# Patient Record
Sex: MALE | Race: WHITE | Employment: UNEMPLOYED | URBAN - METROPOLITAN AREA
[De-identification: names, ages, dates, MRNs, and addresses within clinical notes are randomized per-mention and may not be internally consistent; named-entity substitution may affect disease eponyms.]

---

## 2017-02-06 ENCOUNTER — TELEPHONE (OUTPATIENT)
Dept: NEUROLOGY | Facility: CLINIC | Age: 47
End: 2017-02-06

## 2017-03-21 ENCOUNTER — OFFICE VISIT (OUTPATIENT)
Dept: FAMILY MEDICINE | Facility: CLINIC | Age: 47
End: 2017-03-21

## 2017-03-21 VITALS
RESPIRATION RATE: 16 BRPM | OXYGEN SATURATION: 97 % | SYSTOLIC BLOOD PRESSURE: 131 MMHG | BODY MASS INDEX: 24.2 KG/M2 | WEIGHT: 169 LBS | HEIGHT: 70 IN | HEART RATE: 88 BPM | TEMPERATURE: 97.6 F | DIASTOLIC BLOOD PRESSURE: 81 MMHG

## 2017-03-21 DIAGNOSIS — K21.9 GASTROESOPHAGEAL REFLUX DISEASE WITHOUT ESOPHAGITIS: ICD-10-CM

## 2017-03-21 DIAGNOSIS — F10.10 ALCOHOL ABUSE: ICD-10-CM

## 2017-03-21 RX ORDER — CEPHALEXIN 500 MG/1
500 CAPSULE ORAL 2 TIMES DAILY
Qty: 10 CAPSULE | Refills: 0 | Status: SHIPPED | OUTPATIENT
Start: 2017-03-21 | End: 2017-03-26

## 2017-03-21 NOTE — PATIENT INSTRUCTIONS
Paronychia of the Finger or Toe  Paronychia is an infection near a fingernail or toenail. It usually occurs when an opening in the cuticle or an ingrown toenail lets bacteria under the skin.  The infection will need to be drained if pus is present. If the infection has been caught early, you may need only antibiotic treatment. Healing will take about 1 to 2 weeks.  Home care  Follow these guidelines when caring for yourself at home:    Clean and soak the toe or finger. Do this twice a day for the first 3 days. To do so:    Soak your foot or hand in a tub of warm water for 5 minutes. Or hold your toe or finger under a faucet of warm running water for 5 minutes.    Clean any crust away with soap and water using a cotton swab.    Put antibiotic ointment on the infected area.    Change the dressing daily or any time it becomes soiled.    If you were given antibiotics, take them as directed until they are all gone.    If your infection is on a toe, wear comfortable shoes with a lot of toe room. You can also wear open-toed sandals while your toe heals.    You may use acetaminophen or ibuprofen to help with pain, unless another medicine was prescribed. If you have chronic liver or kidney disease, talk with your health care provider before using these medicines. Also talk with your provider if you've had a stomach ulcer or GI bleeding.  Prevention  The following can prevent paronychia:    Trim or push down the skin around the nail (cuticle).    Don't bite your nails.    Don't suck on your thumbs or fingers.  Follow-up care  Follow up with your health care provider, or as advised.  When to seek medical advice  Call your health care provider right away if any of these occur:    Redness, pain, or swelling of the finger or toe gets worse    Red streaks in the skin leading away from the wound    Pus or fluid drain from the nail area    Fever of 100.4 F (38 C) or higher, or as directed by your health care provider    [id number]  [employer]. [address] [place] [address], [place] [address]. All rights reserved. This information is not intended as a substitute for professional medical care. Always follow your healthcare professional's instructions.      Here is the plan from today's visit    1. Felon  - cephALEXin (KEFLEX) 500 MG capsule; Take 1 capsule (500 mg) by mouth 2 times daily for 5 days  Dispense: 10 capsule; Refill: 0  - Lactobacillus (CVS PROBIOTIC ACIDOPHILUS) 10 MG CAPS; Take 10 mg by mouth 3 times daily for 10 days  Dispense: 30 capsule; Refill: 0    Please call or return to clinic if your symptoms don't go away.    Follow up plan:     Thank you for coming to [facility] today.  Lab Testing:  **If you had lab testing today and your results are reassuring or normal they will be mailed to you or sent through [email or web address] within 7 days.   **If the lab tests need quick action we will call you with the results.  The phone number we will call with results is # [phone] (home) . If this is not the best number please call our clinic and change the number.  Medication Refills:  If you need any refills please call your pharmacy and they will contact us.   If you need to  your refill at a new pharmacy, please contact the new pharmacy directly. The new pharmacy will help you get your medications transferred faster.   Scheduling:  If you have any concerns about today's visit or wish to schedule another appointment please call our office during normal business hours [phone] (8-5:00 M-F)  If a referral was made to a [facility] and you don't get a call from central scheduling please call [phone].  If a Mammogram was ordered for you at [facility] call [phone] to schedule or change your appointment.  If you had an XRay/CT/Ultrasound/MRI ordered the number is [phone] to schedule or change your radiology appointment.   Medical Concerns:  If you have urgent medical concerns please  call [phone] at any time of the day.

## 2017-03-21 NOTE — PROGRESS NOTES
HPI:       Gilbert Adams is a 46 year old who presents for the following  Patient presents with:  Ingrown Toenail      This is a 46 year old male with a past medical history significant for alcohol abuse who presented to the clinic with left big toe pain and erythema.   Patient complained of left ingrown big toe nail, redness and pain that started in January of this year. He initially thought he had a gout attack. His left big toe is tender especially during ambulation. He denied similar symptoms in other joints. He also denies any systemic symptoms: headaches, fevers,  blurry vision, chest pain, shortness of breath, abdominal pain, nausea, and vomiting. He has a history of hepatitis C but not on treatment due to his alcohol abuse. He states that he knows he is suppose to stop drinking but doesn't want anything to help with his drinking for now. 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 He has no other complaints.      Problem, Medication and Allergy Lists were   reviewed and are current.     Patient Active Problem List    Diagnosis Date Noted     Elevated liver enzymes 04/04/2016     Priority: Medium     Seizures (H) 10/24/2016     10/15/16. LOC. Seen at [facility]. Possible EtOH withdrawal or cocaine.  MRI - normal. EEG - not done yet. Neuro consult at [facility].  Discussed chem dep treatment on 10/24/16 - declines       Substance abuse 04/04/2016     Cocaine and alcohol. Not willing to quit - 4/2016, 10/2016  Seizure 10/15/16 - [facility]  Last treatment approx 2010/11  Drinks daily           Current Outpatient Prescriptions   Medication Sig Dispense Refill     cephALEXin (KEFLEX) 500 MG capsule Take 1 capsule (500 mg) by mouth 2 times daily for 5 days 10 capsule 0     Lactobacillus (CVS PROBIOTIC ACIDOPHILUS) 10 MG CAPS Take 10 mg by mouth 3 times daily for 10 days 30 capsule 0     ranitidine (ZANTAC) 150 MG tablet Take 1 tablet (150 mg) by mouth daily 60 tablet 3      "Naproxen Sodium (ALEVE PO) Take by mouth as needed       omeprazole (PRILOSEC) 40 MG capsule Take 1 capsule (40 mg) by mouth daily Take 30-60 minutes before a meal. 90 capsule 1       No Known Allergies  Patient is an established patient of this clinic.         Review of Systems:   Review of Systems Review of system negative except as mentioned in HPI.           Physical Exam:   Patient Vitals for the past 24 hrs:   BP Temp Temp src Pulse Resp SpO2 Height Weight   03/21/17 1304 131/81 97.6  F (36.4  C) Oral 88 16 97 % 5' 10\" (177.8 cm) 169 lb (76.7 kg)     Body mass index is 24.25 kg/(m^2).  Vitals were reviewed and were normal     Physical Exam   Constitutional: He appears well-developed and well-nourished.   Alcohol odor    HENT:   Head: Normocephalic and atraumatic.   Right Ear: External ear normal.   Left Ear: External ear normal.   Eyes: Conjunctivae are normal.   Cardiovascular: Normal rate.    Pulmonary/Chest: Effort normal. No respiratory distress. He has no wheezes. He has no rales.   Musculoskeletal:        Feet:          Results:      Results from the last 24 hoursNo results found for this or any previous visit (from the past 24 hour(s)).  Assessment and Plan       Yovani  Comments:  Left big toe swelling, erythema, and tenderness suggestive of paronychia. Swelling not significant enough to warrant I&D procedure. Recommended soaking the toe twice in the next 3-5 days in warm water. I will prescribe Keflex and Probiotics (For GI protection). He will follow up with persistent swelling or systemic symptoms (Refer to patient instruction).   Orders:  -     cephALEXin (KEFLEX) 500 MG capsule; Take 1 capsule (500 mg) by mouth 2 times daily for 5 days  -     Lactobacillus (CVS PROBIOTIC ACIDOPHILUS) 10 MG CAPS; Take 10 mg by mouth 3 times daily for 10 days    Gastroesophageal reflux disease without esophagitis:   -     ranitidine (ZANTAC) 150 MG tablet; Take 1 tablet (150 mg) by mouth daily    Alcohol abuse: "   Patient has a history of alcohol abuse and Hep-C. Discussed increased risk of liver cirrhosis with Hepatitis C infection in addition to alcohol use. Recommended treatment options to assist with alcohol use but patient declined options at this moment. Alcohol use should be addressed during next clinic follow up.         Medications Discontinued During This Encounter   Medication Reason     ranitidine (ZANTAC) 150 MG tablet Reorder     Options for treatment and follow-up care were reviewed with the patient. Gilbert Adams  engaged in the decision making process and verbalized understanding of the options discussed and agreed with the final plan.    Mark Gonzalez MD  PGY2 [facility] Resident   Pager: [phone]

## 2017-03-21 NOTE — PROGRESS NOTES
Preceptor Attestation:   Patient seen and discussed with the resident. Assessment and plan reviewed with resident and agreed upon.   Supervising Physician:  Manjula Vernon MD  [facility]

## 2017-03-21 NOTE — MR AVS SNAPSHOT
After Visit Summary   3/21/2017    Gilbert Adams    MRN: [id number]           Patient Information     Date Of Birth          1970        Visit Information        Provider Department      3/21/2017 1:00 PM Mark Keller MD [facility]        Today's Diagnoses     Felon    -  1      Care Instructions      Paronychia of the Finger or Toe  Paronychia is an infection near a fingernail or toenail. It usually occurs when an opening in the cuticle or an ingrown toenail lets bacteria under the skin.  The infection will need to be drained if pus is present. If the infection has been caught early, you may need only antibiotic treatment. Healing will take about 1 to 2 weeks.  Home care  Follow these guidelines when caring for yourself at home:    Clean and soak the toe or finger. Do this twice a day for the first 3 days. To do so:    Soak your foot or hand in a tub of warm water for 5 minutes. Or hold your toe or finger under a faucet of warm running water for 5 minutes.    Clean any crust away with soap and water using a cotton swab.    Put antibiotic ointment on the infected area.    Change the dressing daily or any time it becomes soiled.    If you were given antibiotics, take them as directed until they are all gone.    If your infection is on a toe, wear comfortable shoes with a lot of toe room. You can also wear open-toed sandals while your toe heals.    You may use acetaminophen or ibuprofen to help with pain, unless another medicine was prescribed. If you have chronic liver or kidney disease, talk with your health care provider before using these medicines. Also talk with your provider if you've had a stomach ulcer or GI bleeding.  Prevention  The following can prevent paronychia:    Trim or push down the skin around the nail (cuticle).    Don't bite your nails.    Don't suck on your thumbs or fingers.  Follow-up care  Follow up with your health care provider, or as  advised.  When to seek medical advice  Call your health care provider right away if any of these occur:    Redness, pain, or swelling of the finger or toe gets worse    Red streaks in the skin leading away from the wound    Pus or fluid drain from the nail area    Fever of 100.4 F (38 C) or higher, or as directed by your health care provider    [id number] [employer]. [address] [place] [address], [place] [address]. All rights reserved. This information is not intended as a substitute for professional medical care. Always follow your healthcare professional's instructions.      Here is the plan from today's visit    1. Felon  - cephALEXin (KEFLEX) 500 MG capsule; Take 1 capsule (500 mg) by mouth 2 times daily for 5 days  Dispense: 10 capsule; Refill: 0  - Lactobacillus (CVS PROBIOTIC ACIDOPHILUS) 10 MG CAPS; Take 10 mg by mouth 3 times daily for 10 days  Dispense: 30 capsule; Refill: 0    Please call or return to clinic if your symptoms don't go away.    Follow up plan:     Thank you for coming to [facility] today.  Lab Testing:  **If you had lab testing today and your results are reassuring or normal they will be mailed to you or sent through [employer] within 7 days.   **If the lab tests need quick action we will call you with the results.  The phone number we will call with results is # [phone] (home) . If this is not the best number please call our clinic and change the number.  Medication Refills:  If you need any refills please call your pharmacy and they will contact us.   If you need to  your refill at a new pharmacy, please contact the new pharmacy directly. The new pharmacy will help you get your medications transferred faster.   Scheduling:  If you have any concerns about today's visit or wish to schedule another appointment please call our office during normal business hours [phone] (8-5:00 M-F)  If a referral was made to a [facility] and you don't get a  call from central scheduling please call [phone].  If a Mammogram was ordered for you at [facility] call [phone] to schedule or change your appointment.  If you had an XRay/CT/Ultrasound/MRI ordered the number is [phone] to schedule or change your radiology appointment.   Medical Concerns:  If you have urgent medical concerns please call [phone] at any time of the day.          Follow-ups after your visit        Who to contact     Please call your clinic at [phone] to:    Ask questions about your health    Make or cancel appointments    Discuss your medicines    Learn about your test results    Speak to your doctor   If you have compliments or concerns about an experience at your clinic, or if you wish to file a complaint, please contact [facility] Patient Relations at [phone] or email us at [email or web address]         Additional Information About Your Visit        [employer] Information     [employer] gives you secure access to your electronic health record. If you see a primary care provider, you can also send messages to your care team and make appointments. If you have questions, please call your primary care clinic.  If you do not have a primary care provider, please call [phone] and they will assist you.      [employer] is an electronic gateway that provides easy, online access to your medical records. With [employer], you can request a clinic appointment, read your test results, renew a prescription or communicate with your care team.     To access your existing account, please contact your [facility] or call [phone] for assistance.        Care EveryWhere ID     This is your Care EveryWhere ID. This could be used by other organizations to access your [facility] medical records  [id number]        Your Vitals Were     Pulse Temperature Respirations Height Pulse Oximetry BMI (Body Mass Index)    88 97.6  F  "(36.4  C) (Oral) 16 5' 10\" (177.8 cm) 97% 24.25 kg/m2       Blood Pressure from Last 3 Encounters:   03/21/17 131/81   10/24/16 129/84   05/10/16 157/89    Weight from Last 3 Encounters:   03/21/17 169 lb (76.7 kg)   10/24/16 185 lb (83.9 kg)   05/10/16 177 lb (80.3 kg)              Today, you had the following     No orders found for display         Today's Medication Changes          These changes are accurate as of: 3/21/17  1:19 PM.  If you have any questions, ask your nurse or doctor.               Start taking these medicines.        Dose/Directions    cephALEXin 500 MG capsule   Commonly known as:  KEFLEX   Used for:  Felon   Started by:  Mark Keller MD        Dose:  500 mg   Take 1 capsule (500 mg) by mouth 2 times daily for 5 days   Quantity:  10 capsule   Refills:  0       CVS PROBIOTIC ACIDOPHILUS 10 MG Caps   Used for:  Felon   Started by:  Mark Keller MD        Dose:  10 mg   Take 10 mg by mouth 3 times daily for 10 days   Quantity:  30 capsule   Refills:  0            Where to get your medicines      These medications were sent to [facility] - [address] [address]     Phone:  [phone]     cephALEXin 500 MG capsule    CVS PROBIOTIC ACIDOPHILUS 10 MG Caps                Primary Care Provider Office Phone # Fax #    Debbie Galicia [phone]       [facility] [address] [facility] [address]        Thank you!     Thank you for choosing [facility]  for your care. Our goal is always to provide you with excellent care. Hearing back from our patients is one way we can continue to improve our services. Please take a few minutes to complete the written survey that you may receive in the mail after your visit with us. Thank you!             Your Updated Medication List - Protect others around you: Learn how to safely use, store and throw away your medicines at " www.disposemymeds.org.          This list is accurate as of: 3/21/17  1:19 PM.  Always use your most recent med list.                   Brand Name Dispense Instructions for use    ALEVE PO      Take by mouth as needed       cephALEXin 500 MG capsule    KEFLEX    10 capsule    Take 1 capsule (500 mg) by mouth 2 times daily for 5 days       CVS PROBIOTIC ACIDOPHILUS 10 MG Caps     30 capsule    Take 10 mg by mouth 3 times daily for 10 days       omeprazole 40 MG capsule    priLOSEC    90 capsule    Take 1 capsule (40 mg) by mouth daily Take 30-60 minutes before a meal.       ranitidine 150 MG tablet    ZANTAC    60 tablet    Take 1 tablet (150 mg) by mouth daily

## 2017-06-30 ENCOUNTER — OFFICE VISIT (OUTPATIENT)
Dept: FAMILY MEDICINE | Facility: CLINIC | Age: 47
End: 2017-06-30

## 2017-06-30 VITALS
HEART RATE: 91 BPM | OXYGEN SATURATION: 98 % | DIASTOLIC BLOOD PRESSURE: 88 MMHG | SYSTOLIC BLOOD PRESSURE: 129 MMHG | RESPIRATION RATE: 18 BRPM | TEMPERATURE: 98.4 F | BODY MASS INDEX: 24.54 KG/M2 | WEIGHT: 171 LBS

## 2017-06-30 DIAGNOSIS — F10.10 ALCOHOL ABUSE: ICD-10-CM

## 2017-06-30 DIAGNOSIS — B35.1 ONYCHOMYCOSIS: Primary | ICD-10-CM

## 2017-06-30 DIAGNOSIS — K21.9 GASTROESOPHAGEAL REFLUX DISEASE WITHOUT ESOPHAGITIS: ICD-10-CM

## 2017-06-30 LAB
ALBUMIN SERPL-MCNC: 4.6 MG/DL (ref 3.8–5)
ALP SERPL-CCNC: 57.9 U/L (ref 31.7–110.7)
ALT SERPL-CCNC: 110.7 U/L (ref 0–45)
AST SERPL-CCNC: 120 U/L (ref 0–55)
BILIRUB SERPL-MCNC: <0.4 MG/DL (ref 0.2–1.3)
BILIRUBIN DIRECT: 0.2 MG/DL (ref 0.1–0.3)
BUN SERPL-MCNC: 13.4 MG/DL (ref 7–21)
CALCIUM SERPL-MCNC: 9.7 MG/DL (ref 8.5–10.1)
CHLORIDE SERPLBLD-SCNC: 104.6 MMOL/L (ref 98–110)
CHOLEST SERPL-MCNC: 191.5 MG/DL (ref 0–200)
CHOLEST/HDLC SERPL: 3.1 {RATIO} (ref 0–5)
CO2 SERPL-SCNC: 25 MMOL/L (ref 20–32)
CREAT SERPL-MCNC: 0.7 MG/DL (ref 0.7–1.3)
GFR SERPL CREATININE-BSD FRML MDRD: >90 ML/MIN/1.7 M2
GLUCOSE SERPL-MCNC: 107.2 MG'DL (ref 70–99)
HDLC SERPL-MCNC: 61 MG/DL
LDLC SERPL CALC-MCNC: 108 MG/DL (ref 0–129)
POTASSIUM SERPL-SCNC: 3.4 MMOL/DL (ref 3.3–4.5)
PROT SERPL-MCNC: 8.2 G/DL (ref 6.8–8.8)
SODIUM SERPL-SCNC: 136.8 MMOL/L (ref 132.6–141.4)
TRIGL SERPL-MCNC: 113.7 MG/DL (ref 0–150)
VLDL CHOLESTEROL: 22.7 MG/DL (ref 7–32)

## 2017-06-30 NOTE — PROGRESS NOTES
"      HPI:       Gilbert Adams is a 47 year old who presents for the following  Patient presents with:  Toenail: reoccuring fungus in left big toe      This is a 47 years old male that presented to the clinic due to concerns of left big toe fungal infection. Gilbert was seen in clinic in March due to concerns of left big toe infection. He was diagnosed with Paronychia and keflex was prescribed. Patient reports improvement after stated clinic visit. Toe infection re-occurred again and patient was seen at the [facility]". Ten days course of keflex was prescribed. Today he is concerned that he could have a toe fungal infection. He denied systemic symptoms: Fever, chills, abdominal pain, nausea, and vomiting. He denies pus discharge from affected toe. Toe nails appears to be loose and tender during ambulation. He has no other complaints. He would like a refill for his ranitidine. He has a history of chronic alcohol abuse and Hep-C. He is not ready to quit alcohol at the moment.      Problem, Medication and Allergy Lists were   reviewed and are current.     Patient Active Problem List    Diagnosis Date Noted     Alcohol abuse 03/21/2017     Priority: Medium     Elevated liver enzymes 04/04/2016     Priority: Medium     Seizures (H) 10/24/2016     10/15/16. LOC. Seen at [facility]. Possible EtOH withdrawal or cocaine.  MRI - normal. EEG - not done yet. Neuro consult at [facility].  Discussed chem dep treatment on 10/24/16 - declines       Substance abuse 04/04/2016     Cocaine and alcohol. Not willing to quit - 4/2016, 10/2016  Seizure 10/15/16 - [facility]  Last treatment approx 2010/11  Drinks daily           Current Outpatient Prescriptions   Medication Sig Dispense Refill     ranitidine (ZANTAC) 150 MG tablet Take 1 tablet (150 mg) by mouth daily 60 tablet 3     Naproxen Sodium (ALEVE PO) Take by mouth as needed         No Known Allergies  Patient is an established patient of this clinic.         Review of Systems:   Review of " Systems Review of system negative except as mentioned in HPI.           Physical Exam:   Patient Vitals for the past 24 hrs:   BP Temp Temp src Pulse Resp SpO2 Weight   06/30/17 0940 129/88 98.4  F (36.9  C) Oral 91 18 98 % 171 lb (77.6 kg)     Body mass index is 24.54 kg/(m^2).  Vitals were reviewed and were normal     Physical Exam   Constitutional: He appears well-developed and well-nourished.   HENT:   Head: Normocephalic.   Eyes: Conjunctivae are normal.   Cardiovascular: Normal rate and regular rhythm.    Pulmonary/Chest: Effort normal and breath sounds normal.   Musculoskeletal:        Feet:    Normal right foot exam.    Skin: Skin is warm. No rash noted.   On exposed skin   Psychiatric: He has a normal mood and affect.         Results:      Results from the last 24 hours  Results for orders placed or performed in visit on 06/30/17 (from the past 24 hour(s))   Lipid [place] ([facility])   Result Value Ref Range    Cholesterol 191.5 0.0 - 200.0 mg/dL    Cholesterol/HDL Ratio 3.1 0.0 - 5.0    HDL Cholesterol 61.0 >40.0 mg/dL    LDL Cholesterol Calculated 108 0 - 129 mg/dL    Triglycerides 113.7 0.0 - 150.0 mg/dL    VLDL Cholesterol 22.7 7.0 - 32.0 mg/dL   Basic Metabolic Panel ([facility])   Result Value Ref Range    Urea Nitrogen 13.4 7.0 - 21.0 mg/dL    Calcium 9.7 8.5 - 10.1 mg/dL    Chloride 104.6 98.0 - 110.0 mmol/L    Carbon Dioxide 25.0 20.0 - 32.0 mmol/L    Creatinine 0.7 0.7 - 1.3 mg/dL    Glucose 107.2 (H) 70.0 - 99.0 mg'dL    Potassium 3.4 3.3 - 4.5 mmol/dL    Sodium 136.8 132.6 - 141.4 mmol/L    GFR Estimate >90 >60.0 mL/min/1.7 m2    GFR Estimate If Black >90 >60.0 mL/min/1.7 m2   Hepatic Panel ([facility])   Result Value Ref Range    Albumin 4.6 3.8 - 5.0 mg/dL    Alkaline Phosphatase 57.9 31.7 - 110.7 U/L    .7 (H) 0.0 - 45.0 U/L    .0 (H) 0.0 - 55.0 U/L    Bilirubin Direct 0.2 0.1 - 0.3 mg/dL    Bilirubin Total <0.4 0.2 - 1.3 mg/dL    Protein Total 8.2 6.8 - 8.8 g/dL     Assessment and  Karen Hunt was seen today for toenail.    Diagnoses and all orders for this visit:    Onychomycosis:  Dysmorphic and loose left big toe nail. Patient has been treated twice for left big toe infection. Case was discussed with attending physician and recommendation was to refer patient to Podiatry. Exam suggestive of early signs of onychomycosis. No findings suggestive of recurrent paronychia or cellulitis. Patient agreed to medical plan.    -     PODIATRY/FOOT & ANKLE SURGERY REFERRAL    Gastroesophageal reflux disease without esophagitis  -     ranitidine (ZANTAC) 150 MG tablet; Take 1 tablet (150 mg) by mouth daily    Alcohol abuse  -     Lipid [place] ([facility])  -     Basic Metabolic Panel ([facility])  -     Hepatic Panel ([facility])    Medications Discontinued During This Encounter   Medication Reason     ranitidine (ZANTAC) 150 MG tablet Reorder     Options for treatment and follow-up care were reviewed with the patient. Gilbert Adasm  engaged in the decision making process and verbalized understanding of the options discussed and agreed with the final plan.    Mark Keller. MD  PGY2 [facility] Resident   Pager: [phone]

## 2017-06-30 NOTE — PROGRESS NOTES
Preceptor Attestation:   Patient seen and discussed with the resident. Assessment and plan reviewed with resident and agreed upon.   Supervising Physician:  Arron Jackson MD  [facility]

## 2017-06-30 NOTE — MR AVS SNAPSHOT
After Visit Summary   6/30/2017    Gilbert Adams    MRN: [id number]           Patient Information     Date Of Birth          1970        Visit Information        Provider Department      6/30/2017 9:40 AM Mark Keller MD [facility]        Today's Diagnoses     Onychomycosis    -  1    Gastroesophageal reflux disease without esophagitis        Alcohol abuse          Care Instructions    Here is the plan from today's visit    1. Gastroesophageal reflux disease without esophagitis    - ranitidine (ZANTAC) 150 MG tablet; Take 1 tablet (150 mg) by mouth daily  Dispense: 60 tablet; Refill: 3    2. Onychomycosis    - PODIATRY/FOOT & ANKLE SURGERY REFERRAL    Please call or return to clinic if your symptoms don't go away.    Follow up plan:     Thank you for coming to [facility] today.  Lab Testing:  **If you had lab testing today and your results are reassuring or normal they will be mailed to you or sent through [employer] within 7 days.   **If the lab tests need quick action we will call you with the results.  The phone number we will call with results is # [phone] (home) . If this is not the best number please call our clinic and change the number.  Medication Refills:  If you need any refills please call your pharmacy and they will contact us.   If you need to  your refill at a new pharmacy, please contact the new pharmacy directly. The new pharmacy will help you get your medications transferred faster.   Scheduling:  If you have any concerns about today's visit or wish to schedule another appointment please call our office during normal business hours [phone] (8-5:00 M-F)  If a referral was made to a [facility] and you don't get a call from central scheduling please call [phone].  If a Mammogram was ordered for you at [facility] call [phone] to schedule or change your appointment.  If you had an  XRay/CT/Ultrasound/MRI ordered the number is [phone] to schedule or change your radiology appointment.   Medical Concerns:  If you have urgent medical concerns please call [phone] at any time of the day.    Naltrexone tablets  What is this medicine?  NALTREXONE (nal TREX one) helps you to remain free of your dependence on opiate drugs or alcohol. It blocks the 'high' that these substances can give you. This medicine is combined with counseling and support groups.  How should I use this medicine?  Take this medicine by mouth with a full glass of water. Follow the directions on the prescription label. Do not take this medicine within 7 to 10 days of taking any opioid drugs. Take your medicine at regular intervals. Do not take your medicine more often than directed. Do not stop taking except on your doctor's advice.  Talk to your pediatrician regarding the use of this medicine in children. Special care may be needed.  What side effects may I notice from receiving this medicine?  Side effects that you should report to your doctor or health care professional as soon as possible:    allergic reactions like skin rash, itching or hives, swelling of the face, lips, or tongue    breathing problems    changes in vision, hearing    confusion    dark urine    depressed mood    diarrhea    fast or irregular heart beat    hallucination, loss of contact with reality    light-colored stools    right upper belly pain    suicidal thoughts or other mood changes    unusually weak or tired    vomiting    yellowing of the eyes or skin  Side effects that usually do not require medical attention (report to your doctor or health care professional if they continue or are bothersome):    aches, pains    change in sex drive or performance    feeling anxious    headache    loss of appetite, nausea    runny nose, sinus problems, sneezing    stomach pain    trouble sleeping  What may interact with this medicine?  Do not take this  medicine with any of the following medications:    any prescription or street opioid drug like codiene, heroin, methadone  This medicine may also interact with the following medications:    disulfiram    thioridazine  What if I miss a dose?  If you miss a dose and remember on the same day, take the missed dose. If you do not remember until the next day, ask your doctor or health care professional about rescheduling your doses. Do not take double or extra doses.  Where should I keep my medicine?  Keep out of the reach of children.  Store at room temperature between 20 and 25 degrees C (68 and 77 degrees F). Throw away any unused medicine after the expiration date.  What should I tell my health care provider before I take this medicine?  They need to know if you have any of these conditions:    if you have used drugs or alcohol within 7 to 10 days    kidney disease    liver disease, including hepatitis    an unusual or allergic reaction to naltrexone, other medicines, foods, dyes, or preservatives    pregnant or trying to get pregnant    breast-feeding  What should I watch for while using this medicine?  Your condition will be monitored carefully while you are receiving this medicine. Visit your doctor or health care professional regularly. For this medicine to be most effective you should attend any counseling or support groups that your doctor or health care professional recommends. Do not try to overcome the effects of the medicine by taking large amounts of narcotics or by drinking large amounts of alcohol. This can cause severe problems including death. Also, you may be more sensitive to lower doses of narcotics after you stop taking this medicine.  If you are going to have surgery, tell your doctor or health care professional that you are taking this medicine.  Do not treat yourself for coughs, colds, pain, or diarrhea. Ask your doctor or health care professional for advice. Some of the ingredients may interact  with this medicine and cause side effects.  Wear a medical ID bracelet or chain, and carry a card that describes your disease and details of your medicine and dosage times.  You may get drowsy or dizzy. Do not drive, use machinery, or do anything that needs mental alertness until you know how this medicine affects you. Do not stand or sit up quickly, especially if you are an older patient. This reduces the risk of dizzy or fainting spells. Alcohol may interfere with the effect of this medicine. Avoid alcoholic drinks.  NOTE:This sheet is a summary. It may not cover all possible information. If you have questions about this medicine, talk to your doctor, pharmacist, or health care provider. Copyright  2017 Gold Standard    Here is the plan from today's visit    1. Gastroesophageal reflux disease without esophagitis    - ranitidine (ZANTAC) 150 MG tablet; Take 1 tablet (150 mg) by mouth daily  Dispense: 60 tablet; Refill: 3    2. Onychomycosis  - PODIATRY/FOOT & ANKLE SURGERY REFERRAL    3. Alcohol abuse    - Lipid [place] ([facility])  - Basic Metabolic Panel ([facility])  - Hepatic Panel ([facility])    Please call or return to clinic if your symptoms don't go away.    Follow up plan: one month     Thank you for coming to [facility] today.  Lab Testing:  **If you had lab testing today and your results are reassuring or normal they will be mailed to you or sent through [employer] within 7 days.   **If the lab tests need quick action we will call you with the results.  The phone number we will call with results is # [phone] (home) . If this is not the best number please call our clinic and change the number.  Medication Refills:  If you need any refills please call your pharmacy and they will contact us.   If you need to  your refill at a new pharmacy, please contact the new pharmacy directly. The new pharmacy will help you get your medications transferred faster.   Scheduling:  If you have any concerns about  today's visit or wish to schedule another appointment please call our office during normal business hours [phone] (8-5:00 M-F)  If a referral was made to a [facility] and you don't get a call from central scheduling please call [phone].  If a Mammogram was ordered for you at [facility] call [phone] to schedule or change your appointment.  If you had an XRay/CT/Ultrasound/MRI ordered the number is [phone] to schedule or change your radiology appointment.   Medical Concerns:  If you have urgent medical concerns please call [phone] at any time of the day.                Follow-ups after your visit        Additional Services     PODIATRY/FOOT & ANKLE SURGERY REFERRAL       Your provider has referred you to: ROGELIO: [facility]: [address] [place] [address] Patient Scheduling Line: [phone] option 1 (scheduling and directions)    Please be aware that coverage of these services is subject to the terms and limitations of your health insurance plan.  Call member services at your health plan with any benefit or coverage questions.      Please bring the following to your appointment:  >>   Any x-rays, CTs or MRIs which have been performed.  Contact the facility where they were done to arrange for  prior to your scheduled appointment.    >>   List of current medications   >>   This referral request   >>   Any documents/labs given to you for this referral    This is a 47 years old male with left big toe pain and erythema. He has gone through two different courses of Keflex for paronychia. He has diastrophic toe nails with early signs of onychomycosis. Please evaluate and treat.                  Who to contact     Please call your clinic at [phone] to:    Ask questions about your health    Make or cancel appointments    Discuss your medicines    Learn about your test results    Speak to your doctor   If you have compliments or concerns about  an experience at your clinic, or if you wish to file a complaint, please contact [facility] Patient Relations at [phone] or email us at [email or web address]         Additional Information About Your Visit        F[employer] Information     [employer] gives you secure access to your electronic health record. If you see a primary care provider, you can also send messages to your care team and make appointments. If you have questions, please call your primary care clinic.  If you do not have a primary care provider, please call [phone] and they will assist you.      Ztail is an electronic gateway that provides easy, online access to your medical records. With Ztail, you can request a clinic appointment, read your test results, renew a prescription or communicate with your care team.     To access your existing account, please contact your [facility] or call [phone] for assistance.        Care EveryWhere ID     This is your Care EveryWhere ID. This could be used by other organizations to access your [place] medical records  [id number]        Your Vitals Were     Pulse Temperature Respirations Pulse Oximetry BMI (Body Mass Index)       91 98.4  F (36.9  C) (Oral) 18 98% 24.54 kg/m2        Blood Pressure from Last 3 Encounters:   06/30/17 129/88   03/21/17 131/81   10/24/16 129/84    Weight from Last 3 Encounters:   06/30/17 171 lb (77.6 kg)   03/21/17 169 lb (76.7 kg)   10/24/16 185 lb (83.9 kg)              We Performed the Following     Basic Metabolic Panel ([facility])     Hepatic Panel ([facility])     Lipid [place] ([facility])     PODIATRY/FOOT & ANKLE SURGERY REFERRAL          Where to get your medicines      These medications were sent to [facility] - [address]  [address] [place], [place] [address] [address]     Phone:  [phone]     ranitidine 150 MG tablet          Primary Care Provider Office Phone # Fax #     Debbie Galicia [phone]       [facility] [address] [facility] [address]        Equal Access to Services     RASHI ELIZONDO : Sarah Tierney, javon arnold, lisavicki kinneymatiara rolonsandratiara, mundo marain hayaan ольгаyasmani chandler koko singh. So [facility] [phone].    ATENCIÓN: Si habla español, tiene a murdock disposición servicios gratuitos de asistencia lingüística. Llame al [phone].    We comply with applicable federal civil rights laws and [place] laws. We do not discriminate on the basis of race, color, national origin, age, disability sex, sexual orientation or gender identity.            Thank you!     Thank you for choosing [facility]  for your care. Our goal is always to provide you with excellent care. Hearing back from our patients is one way we can continue to improve our services. Please take a few minutes to complete the written survey that you may receive in the mail after your visit with us. Thank you!             Your Updated Medication List - Protect others around you: Learn how to safely use, store and throw away your medicines at www.disposemymeds.org.          This list is accurate as of: 6/30/17 10:11 AM.  Always use your most recent med list.                   Brand Name Dispense Instructions for use Diagnosis    ALEVE PO      Take by mouth as needed        ranitidine 150 MG tablet    ZANTAC    60 tablet    Take 1 tablet (150 mg) by mouth daily    Gastroesophageal reflux disease without esophagitis

## 2017-06-30 NOTE — PATIENT INSTRUCTIONS
Here is the plan from today's visit    1. Gastroesophageal reflux disease without esophagitis    - ranitidine (ZANTAC) 150 MG tablet; Take 1 tablet (150 mg) by mouth daily  Dispense: 60 tablet; Refill: 3    2. Onychomycosis    - PODIATRY/FOOT & ANKLE SURGERY REFERRAL    Please call or return to clinic if your symptoms don't go away.    Follow up plan:     Thank you for coming to [facility] today.  Lab Testing:  **If you had lab testing today and your results are reassuring or normal they will be mailed to you or sent through [employer] within 7 days.   **If the lab tests need quick action we will call you with the results.  The phone number we will call with results is # [phone] (home) . If this is not the best number please call our clinic and change the number.  Medication Refills:  If you need any refills please call your pharmacy and they will contact us.   If you need to  your refill at a new pharmacy, please contact the new pharmacy directly. The new pharmacy will help you get your medications transferred faster.   Scheduling:  If you have any concerns about today's visit or wish to schedule another appointment please call our office during normal business hours [phone] (8-5:00 M-F)  If a referral was made to a [facility] and you don't get a call from central scheduling please call [phone].  If a Mammogram was ordered for you at [facility] call [phone] to schedule or change your appointment.  If you had an XRay/CT/Ultrasound/MRI ordered the number is [phone] to schedule or change your radiology appointment.   Medical Concerns:  If you have urgent medical concerns please call [phone] at any time of the day.    Naltrexone tablets  What is this medicine?  NALTREXONE (nal TREX one) helps you to remain free of your dependence on opiate drugs or alcohol. It blocks the 'high' that these substances can give you. This medicine is combined with  counseling and support groups.  How should I use this medicine?  Take this medicine by mouth with a full glass of water. Follow the directions on the prescription label. Do not take this medicine within 7 to 10 days of taking any opioid drugs. Take your medicine at regular intervals. Do not take your medicine more often than directed. Do not stop taking except on your doctor's advice.  Talk to your pediatrician regarding the use of this medicine in children. Special care may be needed.  What side effects may I notice from receiving this medicine?  Side effects that you should report to your doctor or health care professional as soon as possible:    allergic reactions like skin rash, itching or hives, swelling of the face, lips, or tongue    breathing problems    changes in vision, hearing    confusion    dark urine    depressed mood    diarrhea    fast or irregular heart beat    hallucination, loss of contact with reality    light-colored stools    right upper belly pain    suicidal thoughts or other mood changes    unusually weak or tired    vomiting    yellowing of the eyes or skin  Side effects that usually do not require medical attention (report to your doctor or health care professional if they continue or are bothersome):    aches, pains    change in sex drive or performance    feeling anxious    headache    loss of appetite, nausea    runny nose, sinus problems, sneezing    stomach pain    trouble sleeping  What may interact with this medicine?  Do not take this medicine with any of the following medications:    any prescription or street opioid drug like codiene, heroin, methadone  This medicine may also interact with the following medications:    disulfiram    thioridazine  What if I miss a dose?  If you miss a dose and remember on the same day, take the missed dose. If you do not remember until the next day, ask your doctor or health care professional about rescheduling your doses. Do not take double or  extra doses.  Where should I keep my medicine?  Keep out of the reach of children.  Store at room temperature between 20 and 25 degrees C (68 and 77 degrees F). Throw away any unused medicine after the expiration date.  What should I tell my health care provider before I take this medicine?  They need to know if you have any of these conditions:    if you have used drugs or alcohol within 7 to 10 days    kidney disease    liver disease, including hepatitis    an unusual or allergic reaction to naltrexone, other medicines, foods, dyes, or preservatives    pregnant or trying to get pregnant    breast-feeding  What should I watch for while using this medicine?  Your condition will be monitored carefully while you are receiving this medicine. Visit your doctor or health care professional regularly. For this medicine to be most effective you should attend any counseling or support groups that your doctor or health care professional recommends. Do not try to overcome the effects of the medicine by taking large amounts of narcotics or by drinking large amounts of alcohol. This can cause severe problems including death. Also, you may be more sensitive to lower doses of narcotics after you stop taking this medicine.  If you are going to have surgery, tell your doctor or health care professional that you are taking this medicine.  Do not treat yourself for coughs, colds, pain, or diarrhea. Ask your doctor or health care professional for advice. Some of the ingredients may interact with this medicine and cause side effects.  Wear a medical ID bracelet or chain, and carry a card that describes your disease and details of your medicine and dosage times.  You may get drowsy or dizzy. Do not drive, use machinery, or do anything that needs mental alertness until you know how this medicine affects you. Do not stand or sit up quickly, especially if you are an older patient. This reduces the risk of dizzy or fainting spells. Alcohol  may interfere with the effect of this medicine. Avoid alcoholic drinks.  NOTE:This sheet is a summary. It may not cover all possible information. If you have questions about this medicine, talk to your doctor, pharmacist, or health care provider. Copyright  2017 Gold Standard    Here is the plan from today's visit    1. Gastroesophageal reflux disease without esophagitis    - ranitidine (ZANTAC) 150 MG tablet; Take 1 tablet (150 mg) by mouth daily  Dispense: 60 tablet; Refill: 3    2. Onychomycosis  - PODIATRY/FOOT & ANKLE SURGERY REFERRAL    3. Alcohol abuse    - Lipid [place] ([facility])  - Basic Metabolic Panel ([facility])  - Hepatic Panel ([facility])    Please call or return to clinic if your symptoms don't go away.    Follow up plan: one month     Thank you for coming to [facility] today.  Lab Testing:  **If you had lab testing today and your results are reassuring or normal they will be mailed to you or sent through [employer] within 7 days.   **If the lab tests need quick action we will call you with the results.  The phone number we will call with results is # [phone] (home) . If this is not the best number please call our clinic and change the number.  Medication Refills:  If you need any refills please call your pharmacy and they will contact us.   If you need to  your refill at a new pharmacy, please contact the new pharmacy directly. The new pharmacy will help you get your medications transferred faster.   Scheduling:  If you have any concerns about today's visit or wish to schedule another appointment please call our office during normal business hours [phone] (8-5:00 M-F)  If a referral was made to a [facility] and you don't get a call from central scheduling please call [phone].  If a Mammogram was ordered for you at [facility] call [phone] to schedule or change your appointment.  If you had an XRay/CT/Ultrasound/MRI ordered the number is  [phone] to schedule or change your radiology appointment.   Medical Concerns:  If you have urgent medical concerns please call [phone] at any time of the day.

## 2019-01-08 NOTE — TELEPHONE ENCOUNTER
Referral made in October by Dr Jackson for convulsions. Left patient 2 messages. Called patient today and was able to get through to him. Patient stated that he saw another provider for this already and does not wish to schedule at this time.    [phone]

## 2019-09-30 ENCOUNTER — HEALTH MAINTENANCE LETTER (OUTPATIENT)
Age: 49
End: 2019-09-30

## 2020-10-20 ENCOUNTER — APPOINTMENT (OUTPATIENT)
Dept: CT IMAGING | Facility: CLINIC | Age: 50
End: 2020-10-20
Attending: EMERGENCY MEDICINE

## 2020-10-20 ENCOUNTER — HOSPITAL ENCOUNTER (EMERGENCY)
Facility: CLINIC | Age: 50
Discharge: LEFT BEFORE DISCHARGE | End: 2020-10-20
Attending: EMERGENCY MEDICINE | Admitting: EMERGENCY MEDICINE

## 2020-10-20 VITALS
SYSTOLIC BLOOD PRESSURE: 127 MMHG | DIASTOLIC BLOOD PRESSURE: 78 MMHG | TEMPERATURE: 97.8 F | OXYGEN SATURATION: 95 % | HEART RATE: 86 BPM | RESPIRATION RATE: 18 BRPM

## 2020-10-20 DIAGNOSIS — F10.239 ALCOHOL DEPENDENCE WITH WITHDRAWAL WITH COMPLICATION (H): ICD-10-CM

## 2020-10-20 DIAGNOSIS — G40.909 RECURRENT SEIZURES (H): ICD-10-CM

## 2020-10-20 DIAGNOSIS — S01.112A LACERATION OF LEFT EYEBROW, INITIAL ENCOUNTER: ICD-10-CM

## 2020-10-20 DIAGNOSIS — E87.6 HYPOPOTASSEMIA: ICD-10-CM

## 2020-10-20 LAB
ALBUMIN SERPL-MCNC: 3.8 G/DL (ref 3.4–5)
ALP SERPL-CCNC: 70 U/L (ref 40–150)
ALT SERPL W P-5'-P-CCNC: 128 U/L (ref 0–70)
ANION GAP SERPL CALCULATED.3IONS-SCNC: 14 MMOL/L (ref 3–14)
AST SERPL W P-5'-P-CCNC: 336 U/L (ref 0–45)
BASOPHILS # BLD AUTO: 0.1 10E9/L (ref 0–0.2)
BASOPHILS NFR BLD AUTO: 1.3 %
BILIRUB SERPL-MCNC: 0.8 MG/DL (ref 0.2–1.3)
BUN SERPL-MCNC: 5 MG/DL (ref 7–30)
CALCIUM SERPL-MCNC: 8.6 MG/DL (ref 8.5–10.1)
CHLORIDE SERPL-SCNC: 101 MMOL/L (ref 94–109)
CO2 SERPL-SCNC: 24 MMOL/L (ref 20–32)
CREAT SERPL-MCNC: 0.67 MG/DL (ref 0.66–1.25)
DIFFERENTIAL METHOD BLD: ABNORMAL
EOSINOPHIL # BLD AUTO: 0.1 10E9/L (ref 0–0.7)
EOSINOPHIL NFR BLD AUTO: 1.1 %
ERYTHROCYTE [DISTWIDTH] IN BLOOD BY AUTOMATED COUNT: 13.1 % (ref 10–15)
ETHANOL SERPL-MCNC: 0.18 G/DL
GFR SERPL CREATININE-BSD FRML MDRD: >90 ML/MIN/{1.73_M2}
GLUCOSE SERPL-MCNC: 97 MG/DL (ref 70–99)
HCT VFR BLD AUTO: 38 % (ref 40–53)
HGB BLD-MCNC: 12.4 G/DL (ref 13.3–17.7)
IMM GRANULOCYTES # BLD: 0 10E9/L (ref 0–0.4)
IMM GRANULOCYTES NFR BLD: 0.4 %
LIPASE SERPL-CCNC: 302 U/L (ref 73–393)
LYMPHOCYTES # BLD AUTO: 1.9 10E9/L (ref 0.8–5.3)
LYMPHOCYTES NFR BLD AUTO: 36.6 %
MCH RBC QN AUTO: 30.1 PG (ref 26.5–33)
MCHC RBC AUTO-ENTMCNC: 32.6 G/DL (ref 31.5–36.5)
MCV RBC AUTO: 92 FL (ref 78–100)
MONOCYTES # BLD AUTO: 0.9 10E9/L (ref 0–1.3)
MONOCYTES NFR BLD AUTO: 17.3 %
NEUTROPHILS # BLD AUTO: 2.3 10E9/L (ref 1.6–8.3)
NEUTROPHILS NFR BLD AUTO: 43.3 %
NRBC # BLD AUTO: 0 10*3/UL
NRBC BLD AUTO-RTO: 0 /100
PLATELET # BLD AUTO: 217 10E9/L (ref 150–450)
POTASSIUM SERPL-SCNC: 2.7 MMOL/L (ref 3.4–5.3)
POTASSIUM SERPL-SCNC: 2.9 MMOL/L (ref 3.4–5.3)
PROT SERPL-MCNC: 8.4 G/DL (ref 6.8–8.8)
RBC # BLD AUTO: 4.12 10E12/L (ref 4.4–5.9)
SODIUM SERPL-SCNC: 138 MMOL/L (ref 133–144)
WBC # BLD AUTO: 5.3 10E9/L (ref 4–11)

## 2020-10-20 PROCEDURE — 80053 COMPREHEN METABOLIC PANEL: CPT | Performed by: EMERGENCY MEDICINE

## 2020-10-20 PROCEDURE — 96375 TX/PRO/DX INJ NEW DRUG ADDON: CPT | Performed by: EMERGENCY MEDICINE

## 2020-10-20 PROCEDURE — 250N000009 HC RX 250: Performed by: EMERGENCY MEDICINE

## 2020-10-20 PROCEDURE — 83690 ASSAY OF LIPASE: CPT | Performed by: EMERGENCY MEDICINE

## 2020-10-20 PROCEDURE — 70450 CT HEAD/BRAIN W/O DYE: CPT | Mod: 26 | Performed by: RADIOLOGY

## 2020-10-20 PROCEDURE — 96365 THER/PROPH/DIAG IV INF INIT: CPT | Performed by: EMERGENCY MEDICINE

## 2020-10-20 PROCEDURE — 93005 ELECTROCARDIOGRAM TRACING: CPT | Performed by: EMERGENCY MEDICINE

## 2020-10-20 PROCEDURE — 70450 CT HEAD/BRAIN W/O DYE: CPT

## 2020-10-20 PROCEDURE — 85025 COMPLETE CBC W/AUTO DIFF WBC: CPT | Performed by: EMERGENCY MEDICINE

## 2020-10-20 PROCEDURE — 72125 CT NECK SPINE W/O DYE: CPT | Mod: 26 | Performed by: RADIOLOGY

## 2020-10-20 PROCEDURE — 250N000011 HC RX IP 250 OP 636: Performed by: EMERGENCY MEDICINE

## 2020-10-20 PROCEDURE — 12011 RPR F/E/E/N/L/M 2.5 CM/<: CPT | Performed by: EMERGENCY MEDICINE

## 2020-10-20 PROCEDURE — 72125 CT NECK SPINE W/O DYE: CPT

## 2020-10-20 PROCEDURE — 84132 ASSAY OF SERUM POTASSIUM: CPT | Performed by: EMERGENCY MEDICINE

## 2020-10-20 PROCEDURE — 80320 DRUG SCREEN QUANTALCOHOLS: CPT | Performed by: EMERGENCY MEDICINE

## 2020-10-20 PROCEDURE — 99285 EMERGENCY DEPT VISIT HI MDM: CPT | Mod: 25 | Performed by: EMERGENCY MEDICINE

## 2020-10-20 RX ORDER — DIAZEPAM 10 MG/2ML
5 INJECTION, SOLUTION INTRAMUSCULAR; INTRAVENOUS ONCE
Status: COMPLETED | OUTPATIENT
Start: 2020-10-20 | End: 2020-10-20

## 2020-10-20 RX ORDER — POTASSIUM CHLORIDE 7.45 MG/ML
10 INJECTION INTRAVENOUS CONTINUOUS
Status: DISCONTINUED | OUTPATIENT
Start: 2020-10-20 | End: 2020-10-21 | Stop reason: HOSPADM

## 2020-10-20 RX ORDER — LORAZEPAM 2 MG/ML
1 INJECTION INTRAMUSCULAR ONCE
Status: COMPLETED | OUTPATIENT
Start: 2020-10-20 | End: 2020-10-20

## 2020-10-20 RX ORDER — DIAZEPAM 10 MG/2ML
INJECTION, SOLUTION INTRAMUSCULAR; INTRAVENOUS
Status: DISCONTINUED
Start: 2020-10-20 | End: 2020-10-21 | Stop reason: HOSPADM

## 2020-10-20 RX ADMIN — LORAZEPAM 1 MG: 2 INJECTION INTRAMUSCULAR; INTRAVENOUS at 17:51

## 2020-10-20 RX ADMIN — POTASSIUM CHLORIDE 10 MEQ: 7.46 INJECTION, SOLUTION INTRAVENOUS at 19:56

## 2020-10-20 RX ADMIN — LIDOCAINE HYDROCHLORIDE 10 ML: 10 INJECTION, SOLUTION EPIDURAL; INFILTRATION; INTRACAUDAL; PERINEURAL at 19:58

## 2020-10-20 RX ADMIN — DIAZEPAM 5 MG: 5 INJECTION, SOLUTION INTRAMUSCULAR; INTRAVENOUS at 21:43

## 2020-10-20 ASSESSMENT — ENCOUNTER SYMPTOMS
NECK PAIN: 0
SEIZURES: 1

## 2020-10-20 NOTE — ED AVS SNAPSHOT
[facility]  [address] [address]  Phone: [phone]                                    Gilbert Adams   MRN: [id number]    Department: [facility]   Date of Visit: 10/20/2020           After Visit Summary Signature Page    I have received my discharge instructions, and my questions have been answered. I have discussed any challenges I see with this plan with the nurse or doctor.    ..........................................................................................................................................  Patient/Patient Representative Signature      ..........................................................................................................................................  Patient Representative Print Name and Relationship to Patient    ..................................................               ................................................  Date                                   Time    ..........................................................................................................................................  Reviewed by Signature/Title    ...................................................              ..............................................  Date                                               Time          22EPIC Rev 08/18

## 2020-10-20 NOTE — ED PROVIDER NOTES
"    [place] EMERGENCY DEPARTMENT ([facility])  October 20, 2020  ED 3 5:41 PM   History     Chief Complaint   Patient presents with     Seizures     Post ictal     The history is provided by the patient and medical records. The history is limited by the condition of the patient (postictal).     Gilbert Adams is a 50 year old male with history of hepatitis C and alcohol abuse who presents via EMS with possible seizure spell as well as fall with head injury.  Patient had an unwitnessed fall today which caused him to hit a wall and fall to the ground.  Neighbors checked on him and found him on the ground, having a seizure lasting approximately 40 seconds.  When seizure stopped he was postictal and confused.  911 was called and EMS found him with a blood sugar of 131 with waxing and waning mental clarity but overall confusion as well as a laceration over his left eyebrow.  He reported alcohol use to medics.  Here he acknowledges having seizures in the past.  He is otherwise really unable to answer questions, replies \"uh, um, uh I do not know\" to most questions.  He is not on anti-epileptic drugs, does not have a neurologist.  He does drink every day. He doesn't know when his last drink was either. When asked if he develops alcohol withdrawal symptoms with alcohol cessation he states \"I don't know.\" No neck pain.     Care everywhere records reviewed, patient had similar symptoms back on 4/24/2019 where he had a fall from standing posture and had a full body seizure of unclear duration.  On EMS arrival he was postictal but clearing.  He was noted to have tongue fasciculations as well as tremors in his upper extremities.  He sustained a laceration over the occiput with this fall.  At that time he also reported history of possible alcohol withdrawal seizures in setting of daily vodka use.  He was subsequently admitted for alcohol withdrawal seizures, was kept inpatient for 2 days and then discharged to home.  He " was treated with multiple doses of Valium for his withdrawal.    PAST MEDICAL HISTORY:   Past Medical History:   Diagnosis Date     Alcohol abuse     vodka, daily basis     Alcohol withdrawal seizure (H)     multiple withdrawal seizures and falls     Hepatitis C virus        PAST SURGICAL HISTORY:   Past Surgical History:   Procedure Laterality Date     APPENDECTOMY         Past medical history, past surgical history, medications, and allergies were reviewed with the patient. Additional pertinent items: None    FAMILY HISTORY:   Family History   Problem Relation Age of Onset     Diabetes No family hx of      Coronary Artery Disease No family hx of      Hypertension No family hx of      Cerebrovascular Disease No family hx of      Breast Cancer No family hx of      Colon Cancer No family hx of      Prostate Cancer No family hx of      Other Cancer No family hx of        SOCIAL HISTORY:   Social History     Tobacco Use     Smoking status: Current Every Day Smoker     Packs/day: 0.50     Years: 30.00     Pack years: 15.00     Types: Cigarettes     Smokeless tobacco: Never Used     Tobacco comment: 10 cig x day    Substance Use Topics     Alcohol use: Yes     Alcohol/week: 0.0 standard drinks     Comment: 5-6 drinks a day ,     Social history was reviewed with the patient. Additional pertinent items: None      Discharge Medication List as of 10/20/2020 10:26 PM      CONTINUE these medications which have NOT CHANGED    Details   Naproxen Sodium (ALEVE PO) Take by mouth as needed, Historical      ranitidine (ZANTAC) 150 MG tablet Take 1 tablet (150 mg) by mouth daily, Disp-60 tablet, R-3, E-Prescribe              No Known Allergies     Review of Systems   Unable to perform ROS: Other   Musculoskeletal: Negative for neck pain.   Neurological: Positive for seizures.   Alcohol intoxication/postictal confusion  A complete review of systems was performed with pertinent positives and negatives noted in the HPI, and all other  "systems negative.    Physical Exam   BP: (!) 158/100  Pulse: (!) 49  Temp: 97.8  F (36.6  C)  Resp: 12  SpO2: 98 %      Physical Exam  General: awake, alert, NAD  Head: normal cephalic, atraumatic  HEENT: pupils equal, conjugate gaze intact. 2cm laceration over left eyebrow   Neck: Supple, no midline cervical tenderness  CV: regular rate and rhythm without murmur  Lungs: clear to ascultation  Abd: soft, non-tender, no guarding, no peritoneal signs  EXT: No evidence of injury or deformity of upper or lower extremities.  Neuro: awake, answers some questions appropriately but answers \"I do not know\" to several others.  Moves all extremities equally.     ED Course        Procedures        EKG: No signs of acute ischemia patient does have a slightly prolonged QT at 500.  No U waves present.    No results found for this or any previous visit (from the past 24 hour(s)).  Medications   LORazepam (ATIVAN) injection 1 mg (1 mg Intravenous Given 10/20/20 1751)   lidocaine 1 % 10 mL (10 mLs Intradermal Given 10/20/20 1958)   diazepam (VALIUM) injection 5 mg (5 mg Intravenous Given 10/20/20 2143)          [facility] Procedure Note        Laceration Repair:    Performed by: Asa Gonzales MD  Authorized by: Asa Gonzales MD  Consent given by: Patient who states understanding of the procedure being performed after discussing the risks, benefits and alternatives.    Preparation: Patient was prepped and draped in usual sterile fashion.  Irrigation solution: saline    Body area:left eyebrow  Laceration length: 2cm  Contamination: The wound is not contaminated.  Foreign bodies:none  Tendon involvement: none  Anesthesia: Local  Local anesthetic: Lidocaine     1%, with epinephrine  Anesthetic total: 2ml    Debridement: none  Skin closure: Closed with 4 x 5.0 Ethilon  Technique: interrupted  Approximation: close  Approximation difficulty: simple    Patient tolerance: Patient tolerated the procedure well with no immediate " complications.      Assessments & Plan (with Medical Decision Making)   Gilbert is a 50-year-old male who presents with seizure.  Patient is somewhat postictal currently and is mildly confused but has no focal neurologic deficits on exam, moves both extremities equally.  Patient has some evidence of facial trauma otherwise remainder of the head and physical exam is atraumatic.  Patient was given 1 mg of Ativan upon arrival into the ED given the concerns for alcohol withdrawal and seizure.    He is awake mildly confused otherwise alert normal vital signs.  Mild tongue fasciculations on exam    Differential include alcohol withdrawal seizures, primary seizure disorder, electrolyte abnormality, acute intracranial pathology either causing or result from seizure.  Patient will obtain head CT, basic labs and C-spine CT.     Head CT notable for no acute intracranial pathology, C-spine without acute pathology.  Patient initial potassium was 2.7, he also has elevated ALT and AST.  Alcohol level 0.18.  EKG was obtained given the low potassium, he had a slightly prolonged QT.  He was given IV potassium replacement.  Neurology was consulted, nothing to do from their standpoint they felt that these are alcohol withdrawal seizures did not recommend outpatient EEG medicine or further evaluation.    Patient's laceration was closed with suture on repeat evaluation he was awake, alert mentating well, he was becoming anxious, tremulous, with worsening tongue fasciculations and he was given 5 of IV Valium.    Discussed admission with the patient, he adamantly declined.  Discussed that his potassium was low and that he would benefit from IV potassium placement furthermore concerned about his alcohol withdrawal and that this is a life-threatening would brian to treat his alcohol withdrawals.  Patient declined he understood the risks and benefits.  Patient reports that he has been a longstanding alcoholic and has  withdrawals in the past and  "is adamant he does not want to stay.  I also spoke with patient significant other she also agreed and stated that she did not want him hospitalized, she reports \"this is not my first rodeo\" and is comfortable taking him home.  I did explain the risks of alcohol withdrawal to her and low potassium her she expressed their they are both aware of this and they both wanted the patient to be discharged AGAINST MEDICAL ADVICE.  Patient's tetanus was updated.  He was given basic wound care instructions.    This part of the medical record was transcribed by Will Mancia Medical Scribe, from a dictation done by Asa Pichardo MD.     I have reviewed the nursing notes.    I have reviewed the findings, diagnosis, plan and need for follow up with the patient.    Discharge Medication List as of 10/20/2020 10:26 PM          Final diagnoses:   Alcohol dependence with withdrawal with complication (H)   Recurrent seizures (H)   I, Rachel Jane, am serving as a trained medical scribe to document services personally performed by Asa Pichardo MD based on the provider's statements to me on October 20, 2020.  This document has been checked and approved by the attending provider.    Asa VERA MD, was physically present and have reviewed and verified the accuracy of this note documented by Rachel Jane medical scribe.       10/20/2020   [facility] EMERGENCY DEPARTMENT     Asa Pichardo MD  10/21/20 6134    "

## 2020-10-20 NOTE — ED TRIAGE NOTES
Pt was BIBA post witnessed sz. Pt  Has pmhx of sz and was heard falling. Neighbors reported that he was seizing approx 40 sec. Pt has visible left eye lac. Pt admits to ETOH today. Speech garbled at intervals.

## 2020-10-21 LAB — INTERPRETATION ECG - MUSE: NORMAL

## 2020-10-21 NOTE — ED NOTES
Pt request AMA. Doctor aware. PT educated verbalizing understanding of risks. Wife made aware by  per patient permission. PT leaving ama at this time

## 2020-10-21 NOTE — DISCHARGE INSTRUCTIONS
Please return to the ED if you have any new or worsening symptoms, you have worsening withdrawal symptoms, or new concerns.    Please have your potassium rechecked and in the interim eat high potassium foods.  This includes things like bananas, potatoes, tomatoes, orange juice, milk

## 2020-10-21 NOTE — CONSULTS
[facility]  Neurology Consultation    Patient Name:  Gilbert Adams  MRN:  [id number]    :  1970  Date of Service:  2020  Primary care provider:  Darian Santiago      I was asked to see Gilbert Adams in consultation at the request of Dr. Gonzales for the evaluation of concern for seizure.    ASSESSMENT AND PLAN BY PROBLEM:  50 year old male with past medical history of hepatitis C and alcohol use disorder presented to the ED by EMS after a fall and possibly seizure. His neurological exam today is normal except from tremulous upper exts that can be due to alcohol withdrawal. CT scan unremarkable for acute changes.His clinical presentation along with disease time course are most consistent with alcohol withdrawal seizure.     - No seizure work up needed.  - Decrease alcohol use      I reviewed with the patient in detail [place] regulations on seizures and driving. He appeared to clearly understand that He is prohibited from operating a motor vehicle within 3 months following any seizure or other episode with sudden unconsciousness or inability to sit up, and that He is required to report any future such seizure to the DMV within 30 days after the event. I also recommended that He and family review all other activities, and avoid any activities that might lead to self-injury or injury of others, within 3 months following any seizure with impaired awareness or impaired motor control. Such activities include but are not limited to holding babies or young children at heights from which they might be injured if dropped, bathing infants or young children in situations in which they might drown without continuous interactive care by an adult who is fully capable at all times during the bath, operating power cutting or other tools, handling firearms, exposure to heights from which she might fall, exposure to vessels with hot cooking oil or water, and swimming  alone.     Thank you for involving neurology in the care of this patient.  Please do not hesitate to call with questions or concerns.  General neurology pager [phone].    Patient was staffed with Dr. Raines.  -------------------------------------------------------------------------------------    HISTORY OF PRESENT ILLNESS:       50 year old male with past medical history of hepatitis C and alcohol use disorder presented to the ED by EMS after a fall and possibly.  Patient was found down by his neighbor after hearing a sound of him falling, had full body shaking for about 40 seconds and was sleepy afterwards.  911 was called and EMS found him lethargic weak  and normotensive.  He was also found to have left eyebrow laceration secondary to the fall that he had.  Patient himself does not remember the fall and preceding events.  Denies tongue biting and incontinence. Reports drinking every day 4-6 shots of vodka and the last drink that he had was 10 AM this morning.  Has not been trying to decrease the dose of alcohol recently. Reports using cocaine occasionally and the last cocaine use was last week.  Reports having seizures before but does not remember the etiology and does not know if they were related to alcohol withdrawal.    Per chart review patient had at least 2 ED admissions over the past 4 years for fall and seizure that thought to be alcohol-related. Brain imagings and EEG unremarkable.  Was seen by a neurologist outpatient in January 2017, and the etiology of seizures were thought to be alcohol withdrawal and patient was not prescribed AEDs.      PAST MEDICAL HISTORY:        Past Medical History:   Diagnosis Date     Alcohol abuse     vodka, daily basis     Alcohol withdrawal seizure (H)     multiple withdrawal seizures and falls     Hepatitis C virus            Past Surgical History:   Procedure Laterality Date     APPENDECTOMY         MEDICATIONS:   Current Facility-Administered Medications    Medication     diazepam (VALIUM) 5 MG/ML injection     potassium chloride 10 mEq in 100 mL sterile water intermittent infusion (premix)     Current Outpatient Medications   Medication     Naproxen Sodium (ALEVE PO)     ranitidine (ZANTAC) 150 MG tablet     (Not in a hospital admission)                 ALLERGIES:    No Known Allergies    SOCIAL HISTORY  Social History     Socioeconomic History     Marital status: Single     Spouse name: Not on file     Number of children: Not on file     Years of education: Not on file     Highest education level: Not on file   Occupational History     Not on file   Social Needs     Financial resource strain: Not on file     Food insecurity     Worry: Not on file     Inability: Not on file     Transportation needs     Medical: Not on file     Non-medical: Not on file   Tobacco Use     Smoking status: Current Every Day Smoker     Packs/day: 0.50     Years: 30.00     Pack years: 15.00     Types: Cigarettes     Smokeless tobacco: Never Used     Tobacco comment: 10 cig x day    Substance and Sexual Activity     Alcohol use: Yes     Alcohol/week: 0.0 standard drinks     Comment: 5-6 drinks a day ,     Drug use: No     Comment: cocaine occasional, last use end of April 2016     Sexual activity: Yes     Comment: hx of Chlamydia in the past    Lifestyle     Physical activity     Days per week: Not on file     Minutes per session: Not on file     Stress: Not on file   Relationships     Social connections     Talks on phone: Not on file     Gets together: Not on file     Attends Buddhism service: Not on file     Active member of club or organization: Not on file     Attends meetings of clubs or organizations: Not on file     Relationship status: Not on file     Intimate partner violence     Fear of current or ex partner: Not on file     Emotionally abused: Not on file     Physically abused: Not on file     Forced sexual activity: Not on file   Other Topics Concern     Not on file   Social  History Narrative     Not on file         FAMILY HISTORY:  Family History   Problem Relation Age of Onset     Diabetes No family hx of      Coronary Artery Disease No family hx of      Hypertension No family hx of      Cerebrovascular Disease No family hx of      Breast Cancer No family hx of      Colon Cancer No family hx of      Prostate Cancer No family hx of      Other Cancer No family hx of          REVIEW OF SYSTEMS:  10  point ROS was done as per HPI.       PHYSICAL EXAMINATIONS:  Vital Signs: Blood pressure (!) 107/27, pulse 98, temperature 97.8  F (36.6  C), temperature source Oral, resp. rate 23, SpO2 97 %.  General: alert, no distress  Neurologic exam:  Mental Status: alert, oriented to p/p/t.  Knows the current and the past president, unable to spell world, follows commands. Has mild dysarthria. Comprehension and repetition intact.   Cranial Nerves: pupils equal and reactive to light and accommodation, EOMI without nystagmus, eyes move conjugately,mile and eyebrow raise equal, blinking symmetric, no weakness. Lashes are buried on eye squeeze. Nasolabial folds symmetric. Facial sensation (V1-3) equal, jaw opening strong. Tongue midline. Shoulder shrug symmetric, hearing intact to conversation.  Motor: Mild tremor on bilateral upper extremity. No drift. Strength is 5/5 at bilateral upper and lower extremities   reflexes: normoreflexic and symmetric at the biceps/brachioradialis/patellar/achilles. Jaw jerk not hyperreflexic.  Sensation: intact to light touch, throughout   coordination: FNF no dysmetria, bilaterally   gait: Deferred        [facility] OF LABORATORY, PATHOLOGY, AND RADIOLOGY DATA:  Last Comprehensive Metabolic Panel:  Sodium   Date Value Ref Range Status   10/20/2020 138 133 - 144 mmol/L Final     Potassium   Date Value Ref Range Status   10/20/2020 2.9 (L) 3.4 - 5.3 mmol/L Final     Chloride   Date Value Ref Range Status   10/20/2020 101 94 - 109 mmol/L Final     Carbon Dioxide   Date Value Ref  Range Status   10/20/2020 24 20 - 32 mmol/L Final     Anion Gap   Date Value Ref Range Status   10/20/2020 14 3 - 14 mmol/L Final     Glucose   Date Value Ref Range Status   10/20/2020 97 70 - 99 mg/dL Final     Urea Nitrogen   Date Value Ref Range Status   10/20/2020 5 (L) 7 - 30 mg/dL Final     Creatinine   Date Value Ref Range Status   10/20/2020 0.67 0.66 - 1.25 mg/dL Final     GFR Estimate   Date Value Ref Range Status   10/20/2020 >90 >60 mL/min/[1.73_m2] Final     Comment:     Non  GFR Calc  Starting 12/18/2018, serum creatinine based estimated GFR (eGFR) will be   calculated using the Chronic Kidney Disease Epidemiology Collaboration   (CKD-EPI) equation.       Calcium   Date Value Ref Range Status   10/20/2020 8.6 8.5 - 10.1 mg/dL Final       Ethanol level 0.18      CT head without contrast 10/20/2020 6:21 PM     History: Trauma   Comparison: None     Technique: Using multidetector thin collimation helical acquisition  technique, axial, coronal and sagittal CT images from the skull base  to the vertex were obtained without intravenous contrast.     Findings: There is no intracranial hemorrhage, mass effect, or midline  shift. Gray/white matter differentiation in both cerebral hemispheres  is preserved. Ventricles are proportionate to the cerebral sulci. The  basal cisterns are clear. Mild diffuse cerebral volume loss.     The bony calvaria and the bones of the skull base are normal. The  visualized portions of the paranasal sinuses and mastoid air cells are  clear.                                                                       Impression:  No acute intracranial pathology.     Silvia Sorenson MD  Neurology Resident PGY2  Pager [phone]

## 2021-01-15 ENCOUNTER — HEALTH MAINTENANCE LETTER (OUTPATIENT)
Age: 51
End: 2021-01-15

## 2021-02-01 ENCOUNTER — TRANSFERRED RECORDS (OUTPATIENT)
Dept: HEALTH INFORMATION MANAGEMENT | Facility: CLINIC | Age: 51
End: 2021-02-01

## 2021-02-10 ENCOUNTER — HOSPITAL ENCOUNTER (OUTPATIENT)
Dept: BEHAVIORAL HEALTH | Facility: CLINIC | Age: 51
End: 2021-02-10
Attending: FAMILY MEDICINE
Payer: MEDICAID

## 2021-02-10 VITALS — OXYGEN SATURATION: 98 % | TEMPERATURE: 98.3 F

## 2021-02-10 PROBLEM — F19.20 CHEMICAL DEPENDENCY (H): Status: ACTIVE | Noted: 2021-02-10

## 2021-02-10 LAB
LABORATORY COMMENT REPORT: NORMAL
SARS-COV-2 RNA RESP QL NAA+PROBE: NEGATIVE
SPECIMEN SOURCE: NORMAL

## 2021-02-10 PROCEDURE — H2035 A/D TX PROGRAM, PER HOUR: HCPCS | Mod: HQ

## 2021-02-10 PROCEDURE — 87635 SARS-COV-2 COVID-19 AMP PRB: CPT | Performed by: FAMILY MEDICINE

## 2021-02-10 PROCEDURE — 1002N00001 HC LODGING PLUS FACILITY CHARGE ADULT

## 2021-02-10 RX ORDER — ACETAMINOPHEN 325 MG/1
325-650 TABLET ORAL EVERY 4 HOURS PRN
COMMUNITY
End: 2021-03-06

## 2021-02-10 RX ORDER — CALCIUM CARBONATE 750 MG/1
750 TABLET, CHEWABLE ORAL DAILY PRN
COMMUNITY

## 2021-02-10 RX ORDER — IBUPROFEN 200 MG
400 TABLET ORAL EVERY 6 HOURS PRN
COMMUNITY
End: 2021-03-06

## 2021-02-10 RX ORDER — LANOLIN ALCOHOL/MO/W.PET/CERES
3 CREAM (GRAM) TOPICAL
COMMUNITY
End: 2021-03-06

## 2021-02-10 RX ORDER — MAGNESIUM HYDROXIDE/ALUMINUM HYDROXICE/SIMETHICONE 120; 1200; 1200 MG/30ML; MG/30ML; MG/30ML
30 SUSPENSION ORAL EVERY 6 HOURS PRN
COMMUNITY
End: 2021-03-06

## 2021-02-10 RX ORDER — AMOXICILLIN 250 MG
2 CAPSULE ORAL DAILY PRN
COMMUNITY
End: 2021-03-06

## 2021-02-10 RX ORDER — LORATADINE 10 MG/1
10 TABLET ORAL DAILY PRN
COMMUNITY
End: 2021-03-06

## 2021-02-10 ASSESSMENT — ANXIETY QUESTIONNAIRES
7. FEELING AFRAID AS IF SOMETHING AWFUL MIGHT HAPPEN: SEVERAL DAYS
GAD7 TOTAL SCORE: 8
IF YOU CHECKED OFF ANY PROBLEMS ON THIS QUESTIONNAIRE, HOW DIFFICULT HAVE THESE PROBLEMS MADE IT FOR YOU TO DO YOUR WORK, TAKE CARE OF THINGS AT HOME, OR GET ALONG WITH OTHER PEOPLE: SOMEWHAT DIFFICULT
2. NOT BEING ABLE TO STOP OR CONTROL WORRYING: MORE THAN HALF THE DAYS
3. WORRYING TOO MUCH ABOUT DIFFERENT THINGS: NOT AT ALL
4. TROUBLE RELAXING: SEVERAL DAYS
6. BECOMING EASILY ANNOYED OR IRRITABLE: SEVERAL DAYS
5. BEING SO RESTLESS THAT IT IS HARD TO SIT STILL: MORE THAN HALF THE DAYS
1. FEELING NERVOUS, ANXIOUS, OR ON EDGE: SEVERAL DAYS

## 2021-02-10 ASSESSMENT — PATIENT HEALTH QUESTIONNAIRE - PHQ9: SUM OF ALL RESPONSES TO PHQ QUESTIONS 1-9: 5

## 2021-02-10 NOTE — PROGRESS NOTES
"[facility] Assessment      Vital signs:   T 99.2  123/72  P82 99%    Direct admission    Counselor: Group SHAWN Mejia/Nilesh  Drug of Choice:Alcohol and Cocaine  Last use:   Alcohol KIESHA 1/30/21  1 drink  Cocaine KIESHA 1/15/21  Home clinic/MD:   Saurav Meléndez    Psychiatrist/therapist: Denied    Medical history/current conditions:   Hep C. Pt has a seizure history, which appears r/t alcohol.  Pt denied any medications prescribed to him.    H&P Screen:  H&P within the last 90 days: Yes.  Date: 1/26/20201-1/30/2021 Location: [address] diagnosis: Denied  Medication compliant?:  No home medications  Recent sucidal thoughts?   Denies     Current thought of self-harm? Denies        Pain assessment:   Pt. Experiencing pain at this time?  Yes.  Rating on 0-10 scale: (1-10 scale): 7.  Location: Big Toe Left Foot  Recent  Result of: a couple of months, usually only hurts at night. But hurts now.       [facility] Medical Screen for COVID19    In the last 2 weeks have you been in an large group gatherings (more than 10 people)? No    Have you been covering your nose and mouth while out in the community? yes    Have you come in contact with anyone in the last month who \"was suspected of\" or \"tested positive\" for COVID-19? No    Have you tested positive for COVID-19 in the past 90 days? No  -If yes pt should not be re-tested until 90 days from day one of symptom onset   UNLESS patient is COVID symptomatic, contact infection prevention for recommendation    \"Do you\" or \"have you\" had....any of the following symptoms in the last 2 weeks? No      Fever or chills     Cough     Shortness of breath or difficulty breathing     New muscle or body aches    New headache     New loss of taste or smell    Sore throat     Congestion or runny nose     New and unexplained Nausea or vomiting     Diarrhea    New and unexplained fatigue    Does the above COVID screen need to be reviewed by Infection " Prevention? No    COVID TEST COMPLETED BY [facility] ? Yes, @ 2/10/210 0945    COVID-19 - Pt informed of the following while at LP: Pt agrees to wear mask while in programming at , except during meals or in room alone.    1)Staff will take temperature and O2Sats twice daily    2) Practice good hand washing hygiene and avoid touching face    3) If pt has any of the symptoms below, notify staff immediately.      Fever     Cough     Shortness of breath or difficulty breathing     Chills     Repeated shaking with chills    Muscle pain     4) COVID testing maybe initiated at admission. Depending on the situation amd symptoms patients may be tested more than once during their stay.  Patient will be required to stay in room until lab results confirm negative. If you are unable to stay in your room you may be asked to leave the program.  If COVID results are positive, You will have to exit the program quarantine as recommended per CDC and then may return for CD treatment after symptoms have resolved    5) Per COVID protocol, during your stay at , social distancing is required AND mouth and nose must be covered at all times with facial mask while out in milieu.      6) Patients will not be allowed to go to any outside appointments, all outside appointments will need to be virtual or by phone      Integrative Therapies: Essential Oils    Patient requesting essential oil inhaler to manage (Mood/Mental Health/Physical/Spiritual symptoms).     Discussed appropriate use of essential oil inhalers and instructed patient not to leave labeled product out on unit.     Patient was screened for kidney disease, asthma/reactive airway disease and rashes and wounds or 1st trimester of pregnancy    List Essential Oils requested by pt De-Crave for tobacco and Lavender for anxiety.    Patient verbalized and demonstrated understanding of how to use essential oil inhaler correctly and will notify LP RN with any concerns or side effects. Patient  agrees not to share their essential oil inhaler with other clients.  Continue to support the patient in safely utilizing integrative therapies as able to manage symptoms during treatment.       Patient tobacco use:    Do you use tobacco? Yes   Type? Cigarettes, roll my own   How often? Some days none, some days alot  How much? When I do smoke, 6-10 a day  Are you interested in quitting? Somewhat  NRT (Nicotine Replacement therapy) ordered? Pt would like Nicotine patches and gum.  Pt is aware of the dangers of tobacco cessation and in contemplation.    Pt given written education.      Nutritional Assessment:    Have you ever purged, binged or restricted yourself as a way to control your weight?   No     Are you on a special diet?   No     Do you have any concerns regarding your nutritional status?   No     Have you had any appetite changes in the last 3 months?   No   Have you had weight loss or weight gain of more than 10 lbs in the last 3 months?   If patient gained or lost more than 10 lbs, then refer to program RN / attending Physician for assessment.   No   Was the patient informed of BMI?  Height 5''10 Weight 165 lbs    Normal, No Intervention BMI 23.7  Pt will attend mandatory nutritional education from  nursing staff while in    Yes   Have you engaged in any risk-taking behavior that would put you at risk for exposure to blood-borne or sexually transmitted diseases?   No   Do you have any dental problems?   Yes, Pt Missing teeth. But Denies pain or other concerns at this time.           Nursing Assessment Summary:  Pt will have Nicotine patches and gum delivered from home due to having no insurance.    On-going nursing intervention required?  No    Acute care visit recommended: No
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
## 2021-02-10 NOTE — PROGRESS NOTES
Progress Note    This patient had a Rule 25/31 Combined Assessment Form on 2/1/21 completed by Fran Guillory [facility].  This patient was seen for a face to face update of the Rule 25/31 Combined Assessment Form on 2/10/2021 by DANILO Guadarrama.  OUTSIDE: A paper copy of the Rule 25/31 Combined Assessment Form will be placed in the patient's paper chart until being scanned into the patient's electronic medical record in Epic under the Media tab.    Alcohol/Drug use since the last CD evaluation (include date of last use):     No additional substances use since the last CD evaluation     Please note any other clinical changes since the last CD evaluation (such as medication changes, additional legal charges, detoxification admissions, overdoses, etc.)     No significant changes since the last CD evaluation       ASAM Dimensions Original scores Current Scores   I.) Intoxication and Withdrawal: 1 0   II.) Biomedical:  1 1   III.) Emotional and Behavioral:  0 1   IV.) Readiness to Change:  1 1   V.) Relapse Potential: 4 4   VI.) Recovery Environmental: 3 3     Please list clinical justifications for the above ASAM score changes since the original comprehensive assessment:     The patient's score on Dimension I changed from a 1 to a 0.  The patient had not consumed any alcohol or drugs since 1/30/21 and he does not appear to have any current risk of having significant withdrawal symptoms.   The patient's score on Dimension III changed from a 0 to a 1.  Pt reports mental health diagnosis of anxiety.        Current VICKY: Current UA:     0.000     Positive for Benzodiazepines and negative for all other screened drugs. Pt was prescribed Valium when he was on the detox unit the end of January 2021.       PHQ-9, COLTON-7   PHQ-9 on 2/10/2021 COLTON-7 on 2/10/2021   The patient's PHQ-9 score was 5 out of 27, indicating mild depression.   The patient's COLTON-7 score was 8 out of 21, indicating mild anxiety.       [place]-Suicide  Severity Rating Scale Reassessment   Have you ever wished you were dead or that you could go to sleep and not wake up?  Past Month:  No     Have you actually had any thoughts of killing yourself?  Past Month:  No     Have you been thinking about how you might do this?     Past Month:  No   Lifetime:  Yes, Describe: overdose   Have you had these thoughts and had some intention of acting on them?     Past Month:  No   Lifetime:  No I don't want to hurt my mom's feelings   Have you started to work out the details of how to kill yourself?   Past Month:  No   Lifetime:  No   Do you intend to carry out this plan?   No     When you have the thoughts how long do they last?  The patient denied having any suicidal thoughts within the past month.     Are there things - anyone or anything (i.e. family, Yazidi, pain of death) that stopped you from wanting to die or acting on thoughts of suicide?  Protective factors definitely stopped you from attempting suicide       2008  [employer]  Used with permission by Deidre Johnson, PhD.       Guide to C-SSRS Risk Ratings   NO IDEATION:  with no active thoughts IDEATION: with a wish to die. IDEATION: with active thoughts. Risk Ratings   If Yes No No 0 - Very Low Risk   If NA Yes No 1 - Low Risk   If NA Yes Yes 2 - Low/moderate risk   IDEATION: associated thoughts of methods without intent or plan INTENT: Intent to follow through on suicide PLAN: Plan to follow through on suicide Risk Ratings cont...   If Yes No No 3 - Moderate Risk   If Yes Yes No 4 - High Risk   If Yes Yes Yes 5 - High Risk   The patient's ADDITIONAL RISK FACTORS and lack of PROTECTIVE FACTORS may increase their overall suicide risk ratings.     Additional Risk Factors:    Someone close to the patient (family member/friend) completed a suicide     Significant history of having untreated or poorly treated mental health symptoms   Protective Factors:    Having people in his/her life  "that would prevent the patient from considering a suicide attempt (i.e. young children, spouse, parents, etc.)     Having pet(s) that give companionship and/or would prevent the patient from considering a suicide attempt     Having easy access to supportive family members     Having restricted access to highly lethal means of suicide     Risk Status   0. - Very Low Risk:  Evaluation Counselors:  Document in Epic / SBAR to counselor \"Very Low Risk\".      Treatment Counselors:  Reassess upon admission as applicable, assess weekly in progress notes under Dimension 3 and summarize in Discharge / Treatment summary under Dimension 3.     Additional information to support suicide risk rating: There was no additional information to provide at this time.       "

## 2021-02-10 NOTE — PROGRESS NOTES
Name: Gilbert Adams  Date: 2/10/2021  Medical Record: [id number]  Envelope Number: [id number]  List of Contents (List each item separately in new row):   One [employer] Cell Phone,  One Cord & .     Admission:  I am responsible for any personal items that are not sent to the safe or pharmacy.  [facility] is not responsible for loss, theft or damage of any property in my possession.    Patient Signature:  ___________________________________________       Date/Time:__________________________    Staff Signature: __________________________________       Date/Time:__________________________    2nd Staff person, if patient is unable/unwilling to sign:      __________________________________________________________       Date/Time: __________________________    Discharge:  [facility] has returned all of my personal belongings:    Patient Signature: ________________________________________     Date/Time: ____________________________________    Staff Signature: ______________________________________     Date/Time:_____________________________________

## 2021-02-10 NOTE — PROGRESS NOTES
[facility]  [address] [place] [address] [address] and [address] [place] [address]        ADULT CD ASSESSMENT ADDENDUM      Patient Name: Gilbert Adams  Cell Phone:   Home: [phone] (home)    Mobile:   No relevant phone numbers on file.       Email:  The patient is not willing to share his/her e-mail address.  Emergency Contact: Elis Kent- wife   Tel: [phone]    The patient reported being:      With which race do you identify? White    Initial Screening Questions     1. Are you currently having severe withdrawal symptoms that are putting yourself or others in danger?  No    2. Are you currently having severe medical problems that require immediate attention?  No    3. Are you currently having severe emotional or behavioral problems that are putting yourself or others at risk of harm?  No    4. Do you currently participate in community slava activities, such as attending Sabianist, temple, Yazdanism or Baptist services?  The patient denied currently being involved in any community slava activities.    5. How does your spirituality impact your recovery?  I don't know, that is a deep question. I believe in a higher power, I hope it helps me through this.    6. Do you currently self-administer your medications?  Yes    Do you have a valid 's license?    Yes       Mental Health Status   Physical Appearance/Attire: Appears stated age   Hygiene: well groomed   Eye Contact: at examiner   Speech Rate:  regular   Speech Volume: regular   Speech Quality: fluid   Cognitive/Perceptual:  reality based   Cognition: memory intact    Judgment: intact and able to concentrate   Insight: intact and able to concentrate   Orientation:  time, place, person and situation   Thought: logical    Hallucinations:  none   General Behavioral Tone: cooperative   Psychomotor Activity: no problem noted   Gait:  no problem   Mood: appropriate and anxious   Affect: blunted/restricted   Counselor  Notes: NA     Criteria for Diagnosis: DSM-5 Criteria for Substance Use Disorders      Alcohol Use Disorder Severe - 303.90 (F10.20)  Cocaine Use Disorder Moderate - 304.20 (F14.20)  Tobacco Use Disorder Severe - 305.10 (F17.200)  Anxiety disorder NOS, per patient self-report    Level of Care   I.) Intoxication and Withdrawal: 0   II.) Biomedical:  1   III.) Emotional and Behavioral:  1   IV.) Readiness to Change:  1   V.) Relapse Potential: 4   VI.) Recovery Environmental: 3     Initial Problem List     The patient is currently living in an unhealthy and/or using environment  The patient lacks relapse prevention skills  The patient has poor coping skills  The patient has poor refusal skills   The patient lacks a sober peer support network  The patient has a tendency to isolate  The patient has dual issues of MI and CD  The patient lacks the ability to effectively manage his/her mental health issues    Patient/Client is willing to follow treatment recommendations.  Yes    Counselor: DANILO Guadarrama

## 2021-02-10 NOTE — PROGRESS NOTES
Initial Services Plan        Service Initiation Date: 2/10/2021    Immediate health and/or safety concerns: No    Identify health and safety concern(s) below and include plan to address:    None Identified    Treatment suggestions for client during the time between intake (admit date) and completion of the individual treatment plan:     Look for a sober support network, i.e. 12 step, [facility], [facility], etc  Tour the treatment center or outpatient clinic  Introduce yourself to your treatment group. Spend time getting to know your peers  Review your patient or client handbook  Begin working on your treatment goal list    Completed by: DANILO Guadarrama  Date completed: 2/10/2021 at 11:02 AM

## 2021-02-10 NOTE — PROGRESS NOTES
"Comprehensive Assessment Summary     Based on client interview, review of previous assessments and   comprehensive assessment interview the following diagnosis and recommendations are:     Patient: Gilbert Adams  MRN; [id number]   : 1970  Age: 50 year old Sex: male       Client meets criteria for:   Alcohol Use Disorder Severe - 303.90 (F10.20)  Cocaine Use Disorder Moderate - 304.20 (F14.20)  Tobacco Use Disorder Severe - 305.10 (F17.200)    Dimension One: Acute Intoxication/Withdrawal Potential     Ratin  (Consider the client's ability to cope with withdrawal symptoms and current state of intoxication)   Patient reports his last use date for alcohol and cocaine as 21. Pt denies any current withdrawal discomfort. Patient reports TBI but denies learning disablity.     Dimension Two: Biomedical Condition and Complications    Ratin  (Consider the degree to which any physical disorder would interfere with treatment for substance abuse, and the client's ability to tolerate any related discomfort; determine the impact of continued chemical use on the unborn child if the client is pregnant)  Patient has a history of Hep C and a seizure history.     Dimension Three: Emotional/Behavioral/Cognitive Conditions & Complications  Ratin  (Determine the degree to which any condition or complications are likely to interfere with treatment for substance abuse or with functioning in significant life areas and the likelihood of risk of harm to self or others)  Patient reports a diagnosis of Anxiety, Alcohol use disorder, and Cocaine use disorder. Patient was given the [place] Suicide Severity screening and was assessed currently at \"0- Very Low Risk\".     Dimension Four: Treatment Acceptance/Resistance     Ratin  (Consider the amount of support and encouragement necessary to keep the client involved in treatment)   Patient appears motivated with active reinforcement. Pt appears to be in the " contemplation stage of change.     Dimension Five: Continued Use/Relaspe Prevention     Ratin  (Consider the degree to which the client's recognizes relapse issues and has the skills to prevent relapse of either substance use or mental health problems)   Patient has a history of relapse and reports 3 previous treatments. Patient lacks insight/awareness into his triggers and warning signs. Patient reports being sober [id number].     Dimension Six: Recovery Environment     Rating:   3  (Consider the degree to which key areas of the client's life are supportive of or antagonistic to treatment participation and recovery)   Patient reports that he is  with no children. Patient is currently unemployed and lacks structure. Patient denies any current legal issues.     I have reviewed the information on the assessment, psychosocial and medical history and checklist:        it is current

## 2021-02-11 ENCOUNTER — HOSPITAL ENCOUNTER (OUTPATIENT)
Dept: BEHAVIORAL HEALTH | Facility: CLINIC | Age: 51
End: 2021-02-11
Attending: FAMILY MEDICINE
Payer: MEDICAID

## 2021-02-11 VITALS — TEMPERATURE: 97.9 F | OXYGEN SATURATION: 99 %

## 2021-02-11 PROCEDURE — 1002N00001 HC LODGING PLUS FACILITY CHARGE ADULT

## 2021-02-11 PROCEDURE — H2035 A/D TX PROGRAM, PER HOUR: HCPCS | Mod: HQ

## 2021-02-11 ASSESSMENT — ANXIETY QUESTIONNAIRES: GAD7 TOTAL SCORE: 8

## 2021-02-11 NOTE — GROUP NOTE
Group Therapy Documentation    PATIENT'S NAME: Gilbert Adams  MRN:   [id number]  :   1970  ACCT. NUMBER: [id number]  DATE OF SERVICE: 21  START TIME: 12:30 PM  END TIME:  2:30 PM  FACILITATOR(S): Farooq Hernandez LADC  TOPIC: BEH Group Therapy  Number of patients attending the group:  6  Group Length:  2 Hours    Group Therapy Type: Emotion processing    Summary of Group / Topics Discussed:    Emotions/expression      Group Attendance:  Attended group session    Patient's response to the group topic/interactions:  cooperative with task    Patient appeared to be Actively participating.        Client specific details:  Gilbert participated and interacted appropriately with peers and staff in PM group. No triggers to use noted or discussed.

## 2021-02-11 NOTE — GROUP NOTE
Group Therapy Documentation    PATIENT'S NAME: Gilbert Adams  MRN:   [id number]  :   1970  ACCT. NUMBER: [id number]  DATE OF SERVICE: 21  START TIME:  3:00 PM  END TIME:  4:00 PM  FACILITATOR(S): Hannah Barclay ADC-T  TOPIC: BEH Group Therapy  Number of patients attending the group: 6  Group Length:  1 Hours    Group Therapy Type: Health and wellbeing     Summary of Group / Topics Discussed:    Balanced lifestyle and Self-care activities    Group Attendance:  Attended group session    Patient's response to the group topic/interactions:  cooperative with task and listened actively    Patient appeared to be Actively participating, Attentive and Engaged.        Client specific details: Pt was attentive and participatory during an interactive lecture on nutrition/balanced diet.

## 2021-02-11 NOTE — GROUP NOTE
Group Therapy Documentation    PATIENT'S NAME: Gilbert Adams  MRN:   [id number]  :   1970  ACCT. NUMBER: [id number]  DATE OF SERVICE: 21  START TIME:  9:00 AM  END TIME: 11:00 AM  FACILITATOR(S): Darian Mclaughlin LADC  TOPIC: BEH Group Therapy  Number of patients attending the group:  7  Group Length: 2 hrs    Group Therapy Type: Recovery strategies    Summary of Group / Topics Discussed:    Recovery Principles      Group Attendance:  Attended group session    Patient's response to the group topic/interactions:  cooperative with task    Patient appeared to be Attentive.        Client specific details:  Gilbert attended AM group. Patient was attentive and engaged. Patient reflected on today's reading/meditation and introduced himself to the group. Patient also gave supportive feedback to treatment peers on assignments that were presented.

## 2021-02-11 NOTE — PROGRESS NOTES
Acknowledgement of Current Treatment Plan - Initial Treatment Plan     INITIAL TREATMENT PLAN:     1. I have participated in creating my treatment plan with my therapist / counselor on __________.     I agree with the plan as it is written in the electronic health record.    Name Signature/Date   Patient     Name of Therapist / Counselor Signature/Date   Counselor/Therapist        2. I have completed and reviewed my Safety Plan with my counselor and signed this on _________. I have been given the hard copy of this plan.    Patient signature/date:      _____________________________________________________________________________    3. Last Use Date: __________    Patient signature/date:     _____________________________________________________________________________

## 2021-02-12 ENCOUNTER — HOSPITAL ENCOUNTER (OUTPATIENT)
Dept: BEHAVIORAL HEALTH | Facility: CLINIC | Age: 51
End: 2021-02-12
Attending: FAMILY MEDICINE
Payer: MEDICAID

## 2021-02-12 VITALS — OXYGEN SATURATION: 100 % | TEMPERATURE: 98.2 F

## 2021-02-12 PROCEDURE — 1002N00001 HC LODGING PLUS FACILITY CHARGE ADULT

## 2021-02-12 PROCEDURE — H2035 A/D TX PROGRAM, PER HOUR: HCPCS | Mod: HQ

## 2021-02-12 NOTE — GROUP NOTE
Group Therapy Documentation    PATIENT'S NAME: Gilbert Adams  MRN:   [id number]  :   1970  ACCT. NUMBER: [id number]  DATE OF SERVICE: 21  START TIME:  9:00 AM  END TIME: 11:00 AM  FACILITATOR(S): Darian Mclaughlin LADC  TOPIC: BEH Group Therapy  Number of patients attending the group:  7  Group Length:  2 Hours    Group Therapy Type: Recovery strategies    Summary of Group / Topics Discussed:    Recovery Principles      Group Attendance:  Attended group session    Patient's response to the group topic/interactions:  cooperative with task    Patient appeared to be Attentive.        Client specific details:  Gilbert attended AM group. Patient was attentive and engaged. Patient checked in and gave their reflection on today's meditation reading. Patient took part in a mindfulness exercise, and gave supportive feedback to a peer assignment presentation.

## 2021-02-12 NOTE — GROUP NOTE
Group Therapy Documentation    PATIENT'S NAME: Gilbert Adams  MRN:   [id number]  :   1970  ACCT. NUMBER: [id number]  DATE OF SERVICE: 21  START TIME: 12:30 PM  END TIME:  2:30 PM  FACILITATOR(S): Farooq Hernandez LADC  TOPIC: BEH Group Therapy  Number of patients attending the group:  7  Group Length:  2 Hours    Group Therapy Type: Health and wellbeing     Summary of Group / Topics Discussed:    Self-care activities      Group Attendance:  Attended group session    Patient's response to the group topic/interactions:  cooperative with task    Patient appeared to be Actively participating.        Client specific details:  Gilbert participated and interacted appropriately with peers and staff in PM group. No triggers to use noted or discussed.

## 2021-02-13 ENCOUNTER — HOSPITAL ENCOUNTER (OUTPATIENT)
Dept: BEHAVIORAL HEALTH | Facility: CLINIC | Age: 51
End: 2021-02-13
Attending: FAMILY MEDICINE
Payer: MEDICAID

## 2021-02-13 VITALS — OXYGEN SATURATION: 100 % | TEMPERATURE: 98.4 F

## 2021-02-13 PROCEDURE — H2035 A/D TX PROGRAM, PER HOUR: HCPCS | Mod: HQ

## 2021-02-13 PROCEDURE — 1002N00001 HC LODGING PLUS FACILITY CHARGE ADULT

## 2021-02-13 NOTE — GROUP NOTE
Group Therapy Documentation    PATIENT'S NAME: Gilbert Adams  MRN:   [id number]  :   1970  ACCT. NUMBER: [id number]  DATE OF SERVICE: 21  START TIME:  9:00 AM  END TIME: 11:00 AM  FACILITATOR(S): Gamal Rosario LADC  TOPIC: BEH Group Therapy  Number of patients attending the group:  8  Group Length:  2 Hours    Group Therapy Type: Recovery strategies    Summary of Group / Topics Discussed:    Relapse prevention      Group Attendance:  Attended group session    Patient's response to the group topic/interactions:  cooperative with task    Patient appeared to be Actively participating, Attentive and Engaged.        Client specific details:  Gilbert gave appropriate feedback..  
Group Therapy Documentation    PATIENT'S NAME: Gilbert Adams  MRN:   [id number]  :   1970  ACCT. NUMBER: [id number]  DATE OF SERVICE: 21  START TIME: 12:30 PM  END TIME:  2:30 PM  FACILITATOR(S): Delta Abebe LADC; Gamal Rosario LADC  TOPIC: BEH Group Therapy  Number of patients attending the group:  8  Group Length:  2 Hours    Group Therapy Type: Recovery strategies    Summary of Group / Topics Discussed:    Mindfulness/Relaxation      Group Attendance:  Attended group session    Patient's response to the group topic/interactions:  cooperative with task    Patient appeared to be Attentive.        Client specific details:  Gilbert gave appropriate feedback..  
8

## 2021-02-14 ENCOUNTER — HOSPITAL ENCOUNTER (OUTPATIENT)
Dept: BEHAVIORAL HEALTH | Facility: CLINIC | Age: 51
End: 2021-02-14
Attending: FAMILY MEDICINE
Payer: MEDICAID

## 2021-02-14 VITALS — OXYGEN SATURATION: 100 % | TEMPERATURE: 98.7 F

## 2021-02-14 PROCEDURE — 1002N00001 HC LODGING PLUS FACILITY CHARGE ADULT

## 2021-02-14 PROCEDURE — H2035 A/D TX PROGRAM, PER HOUR: HCPCS | Mod: HQ

## 2021-02-14 NOTE — GROUP NOTE
Group Therapy Documentation    PATIENT'S NAME: Gilbert Adams  MRN:   [id number]  :   1970  ACCT. NUMBER: [id number]  DATE OF SERVICE: 21  START TIME:  8:40 AM  END TIME: 10:30 AM  FACILITATOR(S): Jammie Devlin RN; Hannah Barclay ADC-T  TOPIC: BEH Group Therapy  Number of patients attending the group: 8  Group Length:  2 Hours    Group Therapy Type: Health and wellbeing     Summary of Group / Topics Discussed:    Self-care activities    Group Attendance:  Attended group session    Patient's response to the group topic/interactions:  cooperative with task and listened actively    Patient appeared to be Attentive and Engaged.        Client specific details: Pt participated in a discussion of substance use during pregnancy and STIs/STDs, and participated in a group activity.

## 2021-02-14 NOTE — GROUP NOTE
Group Therapy Documentation    PATIENT'S NAME: Gilbert Adams  MRN:   [id number]  :   1970  ACCT. NUMBER: [id number]  DATE OF SERVICE: 21  START TIME: 12:30 PM  END TIME:  1:30 PM  FACILITATOR(S): Hannah Barclay ADC-T  TOPIC: BEH Group Therapy  Number of patients attending the group: 8  Group Length:  1 Hours    Group Therapy Type: Recovery strategies    Summary of Group / Topics Discussed:    Sober coping skills and Self-care activities    Group Attendance:  Attended group session    Patient's response to the group topic/interactions:  cooperative with task and listened actively    Patient appeared to be Attentive and Engaged.        Client specific details: Pt participated in a discussion about coping with stress in healthy ways to focus on relapse prevention, and engaged in a group activity.

## 2021-02-15 ENCOUNTER — HOSPITAL ENCOUNTER (OUTPATIENT)
Dept: BEHAVIORAL HEALTH | Facility: CLINIC | Age: 51
End: 2021-02-15
Attending: FAMILY MEDICINE
Payer: MEDICAID

## 2021-02-15 VITALS — TEMPERATURE: 98 F | OXYGEN SATURATION: 100 %

## 2021-02-15 PROCEDURE — H2035 A/D TX PROGRAM, PER HOUR: HCPCS | Mod: HQ

## 2021-02-15 PROCEDURE — 1002N00001 HC LODGING PLUS FACILITY CHARGE ADULT

## 2021-02-15 NOTE — PROGRESS NOTES
"Patient:  Gilbert Adams    Day Monday Tuesday Wednesday Thursday Friday Saturday Nilay  Group Hours    4 4 5 4 4 4 2  Skills Hours      1       1     1  Individual Session (LADC)                   Individual Session  (psychotherapy)                   Peer-led Recovery Group    1 1 1 1 1 1 1              Adult CD Progress Note and Treatment Plan Review     Attendance  Please refer to OP BEH CD Adult Attendance Record Documentation Flowsheet    Support group attended this week: Yes     Reporting sobriety: Yes    Treatment Plan     Treatment Plan Review competed on: 2/15/2021      Staff Members contributing: Farooq Hernandez [facility]; Darian Mclaughlin [facility]; Anne Nunes                        Received Supervision: Anne Nunes    Client: Pt contributed to goals and plan.    Client received copy of plan/revised plan: Yes     Client agrees with plan/revised plan: Yes        Changes to Treatment Plan: None at this time    New Goals added since last review: Relapse prevention, address mental health needs, build sober support network, actively find a sponsor, attend 2-3 12-Step meetings per week, tx plan assignments.      Goals worked on since last review: Aftercare planning, sobriety, tx plan assignments, group therapy, build sober support network, psychoeducation.    Strategies effective: Yes     Strategies need these changes: None at this time     1) Care Coordination Activities: Pt expressed an interest in intensive outpatient program at at [facility] and sober support meetings in the community.   2) Medical, Mental Health, and other appointments the client attended: Pt reported no appointments this past week.  3) Medication issues: Pt reported \"having nightmares due to sleeping with nicotine patch. Tried without last night and slept great.\"  4) Physical and mental health problems: Pt reported no concerns at this time.  5) Any changes in Vulnerable Adult Status? No. If yes, add to treatment plan and " "individual abuse prevention plan.  6) Review and evaluation of the individual abuse prevention plan: Current IAPP for this program is adequate for this client.    ASAM Risk Rating:    Dimension 1 0: Pt reported his last use date as 1/30/2021. Pt reported no PAWS symptomatology this past week. Pt will be monitored.     Dimension 2 1: Pt denied having medical or medication issues this past week. Pt did not attend any healthcare appointments this past week and denied scheduling any appointments for the future.    Dimension 3 1: Pt denied having suicidal thoughts at this time. Pt reported significant changes in his mood due to \"the fog lifting and he's happy and feels safe\" this past week. Pt reported no changes in his stress level this past week. Pt reported using as \"breathing and a little meditation\" as his coping techniques for dealing with difficult emotions this past week. Pt reported no triggers to use this past week.    Dimension 4 1: Pt expresses internal motivation for change. Pt is active in group process, accepts and provides feedback, has good insight, and appears engaged. Pt is supportive of his group peers. Pt reported that \"love, health, and hope\" are what motivated him to be sober and to stay in treatment this week.    Dimension 5 4: Pt reported that his cravings were at a 0 this past week on a scale of 1 to 10, 10 being the highest/ most severe. Pt reported that \"these walls\" have been his coping skills he used to manage cravings this past week.      Dimension 6 3: Pt expressed an interest in intensive outpatient program at at [facility] and sober support meetings in the community.     Guide to C-SSRS Risk Ratings   NO IDEATION:  with no active thoughts IDEATION: with a wish to die. IDEATION: with active thoughts. Risk Ratings   If Yes No No 0 - Very Low Risk   If NA Yes No 1 - Low Risk   If NA Yes Yes 2 - Low/moderate risk   IDEATION: associated thoughts of methods without intent or plan " "INTENT: Intent to follow through on suicide PLAN: Plan to follow through on suicide Risk Ratings cont...   If Yes No No 3 - Moderate Risk   If Yes Yes No 4 - High Risk   If Yes Yes Yes 5 - High Risk   The patient's ADDITIONAL RISK FACTORS and lack of PROTECTIVE FACTORS may increase their overall suicide risk ratings.     Pt's current risk rating: \"Very Low Risk\"    Any changes in Vulnerable Adult Status? No.  If yes, add to treatment plan and individual abuse prevention plan.    Family Involvement:   Pt reported \"communications, deliveries, and prayers\" from family members and friends this past week.     Data:   Pt offered feedback, had good insight, and patient actively participated in group. Pt shares openly during group check-in. Pt is very vocal and transparent in groups.       Pt is also gaining trust of peers and counselors.       Intervention:   Counselor feedback  Group feedback  Relapse prevention  Aftercare planning  Cognitive behavior therapy  Counselor feedback  Education  Emotional management  Motivational enhancement therapy   Twelve Step facilitation  Mental health education  Pt and counselor reviewed and signed ISP and assessment summary.      Assessment:   Stages of Change Model  Contemplation     Appears/ Sounds:  Cooperative  Motivated  Engaged      Plan:  Focus on recovery environment  Monitor emotional/physical health    Continue group therapy, go to AA/NA meetings when available, work with sponsor, build sober support network, engage in daily structured activities, and have sober fun.            DANILO Rodríguez        "

## 2021-02-15 NOTE — GROUP NOTE
Group Therapy Documentation    PATIENT'S NAME: Gilbert Adams  MRN:   [id number]  :   1970  ACCT. NUMBER: [id number]  DATE OF SERVICE: 2/15/21  START TIME:  9:00 AM  END TIME: 11:00 AM  FACILITATOR(S): Linda Vasquez LADC; Darian Mclaughlin LADC  TOPIC: BEH Group Therapy  Number of patients attending the group:  7  Group Length: 2 hours    Group Therapy Type: Recovery strategies    Summary of Group / Topics Discussed:    Recovery Principles      Group Attendance:  Attended group session    Patient's response to the group topic/interactions:  cooperative with task    Patient appeared to be Actively participating, Attentive and Engaged.        Client specific details:  Gilbert attended morning group therapy session.  He participated in discussions on dealing with euphoric recall, cross addictions, and recovery goals.  He demonstrated support for other group members who presented assignments by providing positive feedback.

## 2021-02-15 NOTE — GROUP NOTE
Group Therapy Documentation    PATIENT'S NAME: Gilbert Adams  MRN:   [id number]  :   1970  ACCT. NUMBER: [id number]  DATE OF SERVICE: 2/15/21  START TIME: 12:30 PM  END TIME:  2:30 PM  FACILITATOR(S): Farooq Hernandez LADC  TOPIC: BEH Group Therapy  Number of patients attending the group: 7  Group Length:  2 Hours    Group Therapy Type: Recovery strategies    Summary of Group / Topics Discussed:    Recovery Principles      Group Attendance:  Attended group session    Patient's response to the group topic/interactions:  cooperative with task    Patient appeared to be Actively participating.        Client specific details:  Gilbert participated and interacted appropriately with peers and staff in PM group. No triggers to use noted or discussed.

## 2021-02-15 NOTE — ADDENDUM NOTE
Encounter addended by: [facility]: 2/15/2021 6:55 AM   Actions taken: Charge Capture section accepted

## 2021-02-16 ENCOUNTER — HOSPITAL ENCOUNTER (OUTPATIENT)
Dept: BEHAVIORAL HEALTH | Facility: CLINIC | Age: 51
End: 2021-02-16
Attending: FAMILY MEDICINE
Payer: MEDICAID

## 2021-02-16 VITALS — OXYGEN SATURATION: 98 % | TEMPERATURE: 98 F

## 2021-02-16 PROCEDURE — 1002N00001 HC LODGING PLUS FACILITY CHARGE ADULT

## 2021-02-16 PROCEDURE — H2035 A/D TX PROGRAM, PER HOUR: HCPCS | Mod: HQ

## 2021-02-16 NOTE — GROUP NOTE
Group Therapy Documentation    PATIENT'S NAME: Gilbert Adams  MRN:   [id number]  :   1970  ACCT. NUMBER: [id number]  DATE OF SERVICE: 21  START TIME:  9:00 AM  END TIME: 11:00 AM  FACILITATOR(S): Darian Mclaughlin LADC  TOPIC: BEH Group Therapy  Number of patients attending the group:  7  Group Length:  2 Hours    Group Therapy Type: Recovery strategies    Summary of Group / Topics Discussed:    Recovery Principles      Group Attendance:  Attended group session    Patient's response to the group topic/interactions:  cooperative with task    Patient appeared to be Attentive.        Client specific details:  Gilbert attended AM group. Patient talked about resentment, and checked in. Patient also gave treatment peers supportive feedback on presented assignments. Patient also learned about Health Realization's 3 principles.

## 2021-02-16 NOTE — GROUP NOTE
Group Therapy Documentation    PATIENT'S NAME: Gilbert Adams  MRN:   [id number]  :   1970  ACCT. NUMBER: [id number]  DATE OF SERVICE: 21  START TIME:  3:00 PM  END TIME:  4:00 PM  FACILITATOR(S): Patience Caballero LADC; Hannah Barclay LADC; Tiffanie Montes  TOPIC: BEH Group Therapy  Number of patients attending the group:  23  Group Length:  1 Hours    Group Therapy Type: Recovery strategies    Summary of Group / Topics Discussed:    Sober coping skills      Group Attendance:  Attended group session    Patient's response to the group topic/interactions:  cooperative with task    Patient appeared to be Attentive and Engaged.        Client specific details: Gilbert was attentive and an active participant in Skills group.

## 2021-02-16 NOTE — GROUP NOTE
Group Therapy Documentation    PATIENT'S NAME: Gilbert Adams  MRN:   [id number]  :   1970  ACCT. NUMBER: [id number]  DATE OF SERVICE: 21  START TIME: 12:30 PM  END TIME:  2:30 PM  FACILITATOR(S): Farooq Hernandez LADC  TOPIC: BEH Group Therapy  Number of patients attending the group:  7  Group Length:  2 Hours    Group Therapy Type: Recovery strategies    Summary of Group / Topics Discussed:    Relapse prevention      Group Attendance:  Attended group session    Patient's response to the group topic/interactions:  cooperative with task    Patient appeared to be Actively participating.        Client specific details:  Gilbert participated and interacted appropriately with peers and staff in PM group. No triggers to use noted or discussed.

## 2021-02-17 ENCOUNTER — HOSPITAL ENCOUNTER (OUTPATIENT)
Dept: BEHAVIORAL HEALTH | Facility: CLINIC | Age: 51
End: 2021-02-17
Attending: FAMILY MEDICINE
Payer: MEDICAID

## 2021-02-17 VITALS — OXYGEN SATURATION: 98 % | TEMPERATURE: 98.5 F

## 2021-02-17 PROCEDURE — 1002N00001 HC LODGING PLUS FACILITY CHARGE ADULT

## 2021-02-17 PROCEDURE — H2035 A/D TX PROGRAM, PER HOUR: HCPCS | Mod: HQ

## 2021-02-17 NOTE — GROUP NOTE
Group Therapy Documentation    PATIENT'S NAME: Gilbert Adams  MRN:   [id number]  :   1970  ACCT. NUMBER: [id number]  DATE OF SERVICE: 21  START TIME: 10:00 AM  END TIME: 11:30 AM  FACILITATOR(S): Darian Mclaughlin LADC  TOPIC: BEH Group Therapy  Number of patients attending the group:  6  Group Length:  1.5 Hours    Group Therapy Type: Recovery strategies    Summary of Group / Topics Discussed:    Recovery Principles      Group Attendance:  Attended group session    Patient's response to the group topic/interactions:  cooperative with task    Patient appeared to be Attentive.        Client specific details:  Gilbert attended AM group. Patient checked in and talked about being aware when he is slipping into negativity. Patient also took part in a short mindfulness meditation, and gave a treatment peer feedback on their assignment.

## 2021-02-17 NOTE — GROUP NOTE
Group Therapy Documentation    PATIENT'S NAME: Gilbert Adams  MRN:   [id number]  :   1970  ACCT. NUMBER: [id number]  DATE OF SERVICE: 21  START TIME: 12:30 PM  END TIME:  2:30 PM  FACILITATOR(S): Farooq Hernandez LADC  TOPIC: BEH Group Therapy  Number of patients attending the group:  6  Group Length:  2 Hours    Group Therapy Type: Emotion processing    Summary of Group / Topics Discussed:    Relationship/socialization      Group Attendance:  Attended group session    Patient's response to the group topic/interactions:  cooperative with task    Patient appeared to be Actively participating.        Client specific details:  Gilbert participated and interacted appropriately with peers and staff in PM group. No triggers to use noted or discussed.

## 2021-02-17 NOTE — GROUP NOTE
Psychoeducation Group Documentation    PATIENT'S NAME: Gilbert Adams  MRN:   [id number]  :   1970  ACCT. NUMBER: [id number]  DATE OF SERVICE: 21  START TIME:  8:30 AM  END TIME:  9:30 AM  FACILITATOR(S): Keren Prado; Roberth Mishra LADC  TOPIC: BEH Pyschoeducation  Number of patients attending the group:  25  Group Length:  1 Hours    Skills Group Therapy Type: Emotion regulation skills    Summary of Group / Topics Discussed:    Coping/DBT skills and Symptom management skills          Group Attendance:  Attended group session    Patient's response to the group topic/interactions:  cooperative with task    Patient appeared to be Attentive and Engaged.         Client specific details: Patient presented with interest in topic covered.

## 2021-02-18 ENCOUNTER — HOSPITAL ENCOUNTER (OUTPATIENT)
Dept: BEHAVIORAL HEALTH | Facility: CLINIC | Age: 51
End: 2021-02-18
Attending: FAMILY MEDICINE
Payer: MEDICAID

## 2021-02-18 PROCEDURE — 1002N00001 HC LODGING PLUS FACILITY CHARGE ADULT

## 2021-02-18 PROCEDURE — H2035 A/D TX PROGRAM, PER HOUR: HCPCS | Mod: HQ

## 2021-02-18 NOTE — GROUP NOTE
Psychoeducation Group Documentation    PATIENT'S NAME: Gilbert Adams  MRN:   [id number]  :   1970  ACCT. NUMBER: [id number]  DATE OF SERVICE: 21  START TIME:  3:00 PM  END TIME:  4:00 PM  FACILITATOR(S): Roberth Mishra LADC; Tiffanie Montes  TOPIC: BEH Pyschoeducation  Number of patients attending the group:  21  Group Length:  1 Hours    Skills Group Therapy Type: Anxiety and Trauma Brain Study    Summary of Group / Topics Discussed:    Symptom management skills          Group Attendance:  Attended group session    Patient's response to the group topic/interactions:  cooperative with task    Patient appeared to be Attentive and Engaged.         Client specific details:  .

## 2021-02-18 NOTE — GROUP NOTE
Group Therapy Documentation    PATIENT'S NAME: Gilbert Adams  MRN:   [id number]  :   1970  ACCT. NUMBER: [id number]  DATE OF SERVICE: 21  START TIME: 12:30 PM  END TIME:  2:30 PM  FACILITATOR(S): Farooq Hernandez LADC  TOPIC: BEH Group Therapy  Number of patients attending the group:  6  Group Length:  2 Hours    Group Therapy Type: Health and wellbeing     Summary of Group / Topics Discussed:    Spiritual Care      Group Attendance:  Attended group session    Patient's response to the group topic/interactions:  cooperative with task    Patient appeared to be Actively participating.        Client specific details:  Gilbert participated and interacted appropriately with peers and staff in PM group. No triggers to use noted or discussed.

## 2021-02-18 NOTE — GROUP NOTE
Group Therapy Documentation    PATIENT'S NAME: Gilbert Adams  MRN:   [id number]  :   1970  ACCT. NUMBER: [id number]  DATE OF SERVICE: 21  START TIME:  9:00 AM  END TIME: 11:00 AM  FACILITATOR(S): Darian Mclaughlin LADC  TOPIC: BEH Group Therapy  Number of patients attending the group:  6  Group Length:  2 Hours    Group Therapy Type: Recovery strategies    Summary of Group / Topics Discussed:    Recovery Principles      Group Attendance:  Attended group session    Patient's response to the group topic/interactions:  cooperative with task    Patient appeared to be Attentive.        Client specific details:  Gilbert attended AM group. They were attentive and engaged. Patient checked in and took part in a power of music meditation. Patient took part in a self care self-assessment, and gave a peer feedback on a presented assignment.

## 2021-02-19 ENCOUNTER — HOSPITAL ENCOUNTER (OUTPATIENT)
Dept: BEHAVIORAL HEALTH | Facility: CLINIC | Age: 51
End: 2021-02-19
Attending: FAMILY MEDICINE
Payer: MEDICAID

## 2021-02-19 VITALS — OXYGEN SATURATION: 100 % | TEMPERATURE: 97.9 F

## 2021-02-19 PROCEDURE — 1002N00001 HC LODGING PLUS FACILITY CHARGE ADULT

## 2021-02-19 PROCEDURE — H2035 A/D TX PROGRAM, PER HOUR: HCPCS | Mod: HQ

## 2021-02-19 NOTE — GROUP NOTE
Group Therapy Documentation    PATIENT'S NAME: Gilbert Adams  MRN:   [id number]  :   1970  ACCT. NUMBER: [id number]  DATE OF SERVICE: 21  START TIME:  9:00 AM  END TIME: 11:00 AM  FACILITATOR(S): Linda Vasquez LADC; Darian Mclaughlin LADC  TOPIC: BEH Group Therapy  Number of patients attending the group:  7  Group Length: 2 hours    Group Therapy Type: Recovery strategies    Summary of Group / Topics Discussed:    Recovery Principles      Group Attendance:  Attended group session    Patient's response to the group topic/interactions:  cooperative with task    Patient appeared to be Attentive.        Client specific details:  Gilbert attended AM group. Group discussions included: changes in life, fear and how they coped with it. Patient took part in a relaxation mindfulness exercise and also gave feedback after a peer presented an assignment.   .

## 2021-02-19 NOTE — GROUP NOTE
Group Therapy Documentation    PATIENT'S NAME: Gilbert Adams  MRN:   [id number]  :   1970  ACCT. NUMBER: [id number]  DATE OF SERVICE: 21  START TIME:  9:00 AM  END TIME: 11:00 AM  FACILITATOR(S): Linda Vasquez LADC; Darian Mclaughlin LADC  TOPIC: BEH Group Therapy  Number of patients attending the group:  7  Group Length:  2 hours    Group Therapy Type: Recovery strategies    Summary of Group / Topics Discussed:    Recovery Principles      Group Attendance:  Attended group session    Patient's response to the group topic/interactions:  cooperative with task    Patient appeared to be Actively participating, Attentive and Engaged.        Client specific details:  Gilbert attended morning group therapy session.  He participated in a meditative exercise and discussions on adapting to change and distress tolerance skills.  He demonstrated support for another group member by who presented an assignment on his post treatment recovery goals by providing positive feedback.

## 2021-02-19 NOTE — GROUP NOTE
Group Therapy Documentation    PATIENT'S NAME: Gilbert Adams  MRN:   [id number]  :   1970  ACCT. NUMBER: [id number]  DATE OF SERVICE: 21  START TIME: 12:30 PM  END TIME:  2:30 PM  FACILITATOR(S): Ernie Robertson LADC  TOPIC: BEH Group Therapy  Number of patients attending the group:  7  Group Length:  2 Hours    Group Therapy Type: Recovery strategies    Summary of Group / Topics Discussed:    Relationship/socialization, Disease of addiction, and Emotions/expression      Group Attendance:  Attended group session    Patient's response to the group topic/interactions:  cooperative with task    Patient appeared to be Attentive.        Client specific details:  Patient viewed film on redemption. Appeared attentive..

## 2021-02-19 NOTE — GROUP NOTE
Group Therapy Documentation    PATIENT'S NAME: Gilbert Adams  MRN:   [id number]  :   1970  ACCT. NUMBER: [id number]  DATE OF SERVICE: 21  START TIME:  9:00 AM  END TIME: 11:00 AM  FACILITATOR(S): Linda Vasquez LADC; Darian Mclaughlin LADC  TOPIC: BEH Group Therapy  Number of patients attending the group:  7  Group Length:  2 hours    Group Therapy Type: Recovery strategies    Summary of Group / Topics Discussed:    Recovery Principles      Group Attendance:  {Group Attendance:027780}    Patient's response to the group topic/interactions:  {OPBEHCLIENTRESPONSE:265411}    Patient appeared to be {Engagement:856282}.        Client specific details:  ***.

## 2021-02-20 ENCOUNTER — HOSPITAL ENCOUNTER (OUTPATIENT)
Dept: BEHAVIORAL HEALTH | Facility: CLINIC | Age: 51
End: 2021-02-20
Attending: FAMILY MEDICINE
Payer: MEDICAID

## 2021-02-20 VITALS — TEMPERATURE: 97.6 F | OXYGEN SATURATION: 100 %

## 2021-02-20 PROCEDURE — H2035 A/D TX PROGRAM, PER HOUR: HCPCS | Mod: HQ

## 2021-02-20 PROCEDURE — 1002N00001 HC LODGING PLUS FACILITY CHARGE ADULT

## 2021-02-20 NOTE — GROUP NOTE
Group Therapy Documentation    PATIENT'S NAME: Gilbert Adams  MRN:   [id number]  :   1970  ACCT. NUMBER: [id number]  DATE OF SERVICE: 21  START TIME: 12:30 PM  END TIME:  2:30 PM  FACILITATOR(S): Patience Caballero LADC; Roberth Mishra LADC; Farooq Hernandez LADC  TOPIC: BEH Group Therapy  Number of patients attending the group: 28  Group Length:  2 Hours    Group Therapy Type: Recovery strategies    Summary of Group / Topics Discussed:    Relationship/socialization      Group Attendance:  Attended group session    Patient's response to the group topic/interactions:  cooperative with task    Patient appeared to be Attentive and Engaged.        Client specific details: Gilbert was an active participant in afternoon session of Relationships workshop.

## 2021-02-20 NOTE — GROUP NOTE
Group Therapy Documentation    PATIENT'S NAME: Gilbert Adams  MRN:   [id number]  :   1970  ACCT. NUMBER: [id number]  DATE OF SERVICE: 21  START TIME:  9:00 AM  END TIME: 11:00 AM  FACILITATOR(S): Patience Caballero LADC; Roberth Mishra LADC; Farooq Hernandez LADC  TOPIC: BEH Group Therapy  Number of patients attending the group: 28  Group Length:  2 Hours    Group Therapy Type: Recovery strategies    Summary of Group / Topics Discussed:    Relationship/socialization      Group Attendance:  Attended group session    Patient's response to the group topic/interactions:  cooperative with task    Patient appeared to be Attentive and Engaged.        Client specific details: Gilbert was an active participant in morning session of Relationships workshop.

## 2021-02-21 ENCOUNTER — HOSPITAL ENCOUNTER (OUTPATIENT)
Dept: BEHAVIORAL HEALTH | Facility: CLINIC | Age: 51
End: 2021-02-21
Attending: FAMILY MEDICINE
Payer: MEDICAID

## 2021-02-21 VITALS — OXYGEN SATURATION: 98 % | TEMPERATURE: 98.2 F

## 2021-02-21 PROCEDURE — 1002N00001 HC LODGING PLUS FACILITY CHARGE ADULT

## 2021-02-21 PROCEDURE — H2035 A/D TX PROGRAM, PER HOUR: HCPCS | Mod: HQ

## 2021-02-21 NOTE — GROUP NOTE
Group Therapy Documentation    PATIENT'S NAME: Gilbert Adams  MRN:   [id number]  :   1970  ACCT. NUMBER: [id number]  DATE OF SERVICE: 21  START TIME: 12:30 PM  END TIME:  1:30 PM  FACILITATOR(S): Patience Caballero LADC; Roberth Mishra LADC  TOPIC: BEH Group Therapy  Number of patients attending the group:  28  Group Length:  1 Hours    Group Therapy Type: Recovery strategies    Summary of Group / Topics Discussed:    Relationship/socialization and Disease of addiction      Group Attendance:  Attended group session    Patient's response to the group topic/interactions:  cooperative with task    Patient appeared to be Attentive and Engaged.        Client specific details: Gilbert was an active participant in Skills Group on honesty in recovery.

## 2021-02-21 NOTE — GROUP NOTE
Group Therapy Documentation    PATIENT'S NAME: Gilbert Adams  MRN:   [id number]  :   1970  ACCT. NUMBER: [id number]  DATE OF SERVICE: 21  START TIME:  8:45 AM  END TIME: 10:30 AM  FACILITATOR(S): Patience Caballero LADC; Roberth Mishra LADC; Vickie Solis RN  TOPIC: BEH Group Therapy  Number of patients attending the group: 28  Group Length:  2 Hours    Group Therapy Type: Health and wellbeing     Summary of Group / Topics Discussed:    Co-occurring illnesses symptom management and Self-care activities      Group Attendance:  Attended group session    Patient's response to the group topic/interactions:  cooperative with task    Patient appeared to be Attentive and Engaged.        Client specific details: Gilbert was an active participant in nursing lecture on HIV/AIDS.

## 2021-02-22 ENCOUNTER — HOSPITAL ENCOUNTER (OUTPATIENT)
Dept: BEHAVIORAL HEALTH | Facility: CLINIC | Age: 51
End: 2021-02-22
Attending: FAMILY MEDICINE
Payer: MEDICAID

## 2021-02-22 VITALS — TEMPERATURE: 97.7 F | OXYGEN SATURATION: 99 %

## 2021-02-22 PROCEDURE — 1002N00001 HC LODGING PLUS FACILITY CHARGE ADULT

## 2021-02-22 PROCEDURE — H2035 A/D TX PROGRAM, PER HOUR: HCPCS | Mod: HQ

## 2021-02-22 NOTE — GROUP NOTE
Group Therapy Documentation    PATIENT'S NAME: Gilbert Adams  MRN:   [id number]  :   1970  ACCT. NUMBER: [id number]  DATE OF SERVICE: 21  START TIME: 12:30 PM  END TIME:  2:30 PM  FACILITATOR(S): Farooq Hernandez LADC  TOPIC: BEH Group Therapy  Number of patients attending the group:  6  Group Length:  2 Hours    Group Therapy Type: Recovery strategies    Summary of Group / Topics Discussed:    Recovery Principles      Group Attendance:  Attended group session    Patient's response to the group topic/interactions:  cooperative with task    Patient appeared to be Actively participating.        Client specific details:  Gilbert participated and interacted appropriately with peers and staff in PM group. No triggers to use noted or discussed.

## 2021-02-22 NOTE — GROUP NOTE
Group Therapy Documentation    PATIENT'S NAME: Gilbert Adams  MRN:   [id number]  :   1970  ACCT. NUMBER: [id number]  DATE OF SERVICE: 21  START TIME:  9:00 AM  END TIME: 11:00 AM  FACILITATOR(S): Linda Vasquez LADC; Darian Mclaughlin LADC  TOPIC: BEH Group Therapy  Number of patients attending the group:  6  Group Length:  2 hours    Group Therapy Type: Recovery strategies    Summary of Group / Topics Discussed:    Recovery Principles      Group Attendance:  Attended group session    Patient's response to the group topic/interactions:  cooperative with task    Patient appeared to be Actively participating, Attentive and Engaged.        Client specific details:  Gilbert attended morning group therapy session.  He participated in discussions on how using can make one procrastinate, and how family and friends are responding to her now in treatment and before when using.  Gilbert demonstrated support for two group members who graduated by providing positive feedback.

## 2021-02-23 ENCOUNTER — TELEPHONE (OUTPATIENT)
Dept: FAMILY MEDICINE | Facility: CLINIC | Age: 51
End: 2021-02-23

## 2021-02-23 ENCOUNTER — HOSPITAL ENCOUNTER (OUTPATIENT)
Dept: BEHAVIORAL HEALTH | Facility: CLINIC | Age: 51
End: 2021-02-23
Attending: FAMILY MEDICINE
Payer: MEDICAID

## 2021-02-23 VITALS — OXYGEN SATURATION: 99 % | TEMPERATURE: 98.2 F

## 2021-02-23 VITALS — OXYGEN SATURATION: 99 % | TEMPERATURE: 97.8 F

## 2021-02-23 PROCEDURE — H2035 A/D TX PROGRAM, PER HOUR: HCPCS | Mod: HQ

## 2021-02-23 PROCEDURE — 1002N00001 HC LODGING PLUS FACILITY CHARGE ADULT

## 2021-02-23 NOTE — GROUP NOTE
Group Therapy Documentation    PATIENT'S NAME: Gilbert Adams  MRN:   [id number]  :   1970  ACCT. NUMBER: [id number]  DATE OF SERVICE: 21  START TIME: 10:00 AM  END TIME: 11:00 AM  FACILITATOR(S): Linda Vasquez LADC; Darian Mclaughlin LADC  TOPIC: BEH Group Therapy  Number of patients attending the group:  6  Group Length:  1 hour    Group Therapy Type: Recovery strategies    Summary of Group / Topics Discussed:    Recovery Principles      Group Attendance:  Attended group session    Patient's response to the group topic/interactions:  cooperative with task    Patient appeared to be Actively participating, Attentive and Engaged.        Client specific details:  Gilbert attended morning group therapy session.  He participated in discussions on how bad memories can trigger relapse, and 3 signs of his resiliency.  He also participated in a group exercise on resiliency.

## 2021-02-23 NOTE — PROGRESS NOTES
Patient:  Gilbert Adams    Day Monday Tuesday Wednesday Thursday Friday Saturday Nilay   Group Hours   4 hours 4 hours 5 hours 4 hours 4 hours 4 hours 3 hours   Skills Hours   0 hours 1.0 hours 0 hours 1.0 hours 0 hours 0 hours 0 hours   Individual Session (LADC)    0 hours 0 hours 0 hours 0 hours 0 hours 0 hours 0 hours   Individual Session  (psychotherapy)   0 hours 0 hours 0.5 hours 0 hours 0 hours 0 hours 0 hours   Peer-led Recovery Group   1.0 hours 1.0 hours 1.0 hours 1.0 hours 1.0 hours 1.0 hours 1.0 hours                    Adult CD Progress Note and Treatment Plan Review     Attendance  Please refer to OP BEH CD Adult Attendance Record Documentation Flowsheet    Support group attended this week: Yes     Reporting sobriety: Yes    Treatment Plan     Treatment Plan Review competed on: 2/23/2021      Staff Members contributing: Farooq Hernandez [facility]; Darian Mclaughlin [facility]; Anne Nunes                        Received Supervision: Anne Nunes    Client: Pt contributed to goals and plan.    Client received copy of plan/revised plan: Yes     Client agrees with plan/revised plan: Yes        Changes to Treatment Plan: None at this time    New Goals added since last review: Current    Goals worked on since last review: Aftercare planning, sobriety, tx plan assignments, group therapy, build sober support network, psychoeducation.    Strategies effective: Yes     Strategies need these changes: None at this time     1) Care Coordination Activities: Pt expressed an interest in intensive outpatient program at at [facility] and sober support meetings in the community.   2) Medical, Mental Health, and other appointments the client attended: Pt reported no appointments this past week.  3) Medication issues: No issues reported.   4) Physical and mental health problems: Pt reported no concerns at this time.  5) Any changes in Vulnerable Adult Status? No. If yes, add to treatment plan and individual abuse  "prevention plan.  6) Review and evaluation of the individual abuse prevention plan: Current IAPP for this program is adequate for this client.    ASAM Risk Rating:    Dimension 1 0: Pt reported his last use date as 1/30/2021. Pt reported no PAWS symptomatology this past week. Pt will be monitored.     Dimension 2 0: Pt denied having medical or medication issues this past week. Pt did not attend any healthcare appointments this past week and denied scheduling any appointments for the future.    Dimension 3 1: Pt denied having suicidal thoughts at this time. Pt reported significant changes in his mood due to feeling good being sober this past week. Pt reported no changes in his stress level this past week. Pt reported using as \"breathing, drawing,  meditation\" as his coping techniques for dealing with difficult emotions this past week. Pt reported no triggers to use this past week. Patient completed the assignment titled:  Resentments , where patient talked about multiple resentments.     Dimension 4 1: Pt expresses internal motivation for change. Pt is active in group process, accepts and provides feedback, has good insight, and appears engaged. Pt is supportive of his group peers. Pt reported that \"to get healthy\" are what motivated him to be sober and to stay in treatment this week.Patient is working on his \"drug use history\".     Dimension 5 4: Pt reported that his cravings were at a 2 this past week on a scale of 1 to 10, 10 being the highest/ most severe. Pt reported that he's learning a lot since being here have been his coping skills he used to manage cravings this past week.      Dimension 6 3: Pt expressed an interest in intensive outpatient program at at [facility] and sober support meetings in the community. Patient attended the Relationships weekend workshop.    Guide to C-SSRS Risk Ratings   NO IDEATION:  with no active thoughts IDEATION: with a wish to die. IDEATION: with active thoughts. Risk " "Ratings   If Yes No No 0 - Very Low Risk   If NA Yes No 1 - Low Risk   If NA Yes Yes 2 - Low/moderate risk   IDEATION: associated thoughts of methods without intent or plan INTENT: Intent to follow through on suicide PLAN: Plan to follow through on suicide Risk Ratings cont...   If Yes No No 3 - Moderate Risk   If Yes Yes No 4 - High Risk   If Yes Yes Yes 5 - High Risk   The patient's ADDITIONAL RISK FACTORS and lack of PROTECTIVE FACTORS may increase their overall suicide risk ratings.     Pt's current risk rating: \"Very Low Risk\"    Any changes in Vulnerable Adult Status? No.  If yes, add to treatment plan and individual abuse prevention plan.    Family Involvement:   No in person visits this week due to Covid hospital restrictions/precautions.    Data:   Pt offered feedback, had good insight, and patient actively participated in group. Pt shares openly during group check-in. Pt is very vocal and transparent in groups.       Pt is also gaining trust of peers and counselors.       Intervention:   Counselor feedback  Group feedback  Relapse prevention  Aftercare planning  Cognitive behavior therapy  Counselor feedback  Education  Emotional management  Motivational enhancement therapy   Twelve Step facilitation  Mental health education  Pt and counselor reviewed and signed ISP and assessment summary.      Assessment:   Stages of Change Model  Contemplation     Appears/ Sounds:  Cooperative  Motivated  Engaged      Plan:  Focus on recovery environment  Monitor emotional/physical health    Continue group therapy, go to AA/NA meetings when available, work with sponsor, build sober support network, engage in daily structured activities, and have sober fun.            DANILO Green        "

## 2021-02-23 NOTE — TELEPHONE ENCOUNTER
Pt started to complain a couple days ago about tooth pain, he has tried taking ibuprofen every 6 hours and is using ice. Pt appears to have 2 upper teeth a canine and premolar that are brown, broken and the gum line is brown. There is no swelling or redness. Pt reports tooth pain today is a 8+ out of 10. Pt struggling to focus in group. Please advise if a dental consult is appropriate.

## 2021-02-23 NOTE — GROUP NOTE
Group Therapy Documentation    PATIENT'S NAME: Gilbert Adams  MRN:   [id number]  :   1970  ACCT. NUMBER: [id number]  DATE OF SERVICE: 21  START TIME:  3:00 PM  END TIME:  4:00 PM  FACILITATOR(S): Tiffanie Montes; Ernie Robertson LADC; Hannah Barclay ADC-T  TOPIC: BEH Group Therapy  Number of patients attending the group:  5  Group Length:  1 Hours    Group Therapy Type: Recovery strategies    Summary of Group / Topics Discussed:    Balanced lifestyle and Disease of addiction    Group Attendance:  Attended group session    Patient's response to the group topic/interactions:  cooperative with task and listened actively    Patient appeared to be Attentive.        Client specific details: Pt viewed a film with peers portraying the effects of addiction and how the disease can affect anyone.

## 2021-02-24 ENCOUNTER — HOSPITAL ENCOUNTER (OUTPATIENT)
Dept: BEHAVIORAL HEALTH | Facility: CLINIC | Age: 51
End: 2021-02-24
Attending: FAMILY MEDICINE
Payer: MEDICAID

## 2021-02-24 VITALS — OXYGEN SATURATION: 99 % | TEMPERATURE: 98.5 F

## 2021-02-24 PROCEDURE — H2035 A/D TX PROGRAM, PER HOUR: HCPCS | Mod: HQ

## 2021-02-24 PROCEDURE — 1002N00001 HC LODGING PLUS FACILITY CHARGE ADULT

## 2021-02-24 NOTE — GROUP NOTE
Group Therapy Documentation    PATIENT'S NAME: Gilbert Adams  MRN:   [id number]  :   1970  ACCT. NUMBER: [id number]  DATE OF SERVICE: 21  START TIME:  9:45 AM  END TIME: 11:15 AM  FACILITATOR(S): Darian Mclaughlin LADC; Roberth Mishra LADC  TOPIC: BEH Group Therapy  Number of patients attending the group:  5  Group Length:  1.5 Hours    Group Therapy Type: Recovery strategies    Summary of Group / Topics Discussed:    Recovery Principles      Group Attendance:  Attended group session    Patient's response to the group topic/interactions:  cooperative with task    Patient appeared to be Attentive.        Client specific details:  Gilbert attended AM group and appeared attentive and engaged. Patient checked in with how he is feeling today. Patient talked about their recovery and gave feedack on a peer's assignment.

## 2021-02-24 NOTE — GROUP NOTE
Group Therapy Documentation    PATIENT'S NAME: Gilbert Adams  MRN:   [id number]  :   1970  ACCT. NUMBER: [id number]  DATE OF SERVICE: 21  START TIME:  8:30 AM  END TIME:  9:30 AM  FACILITATOR(S): Gamal Rosario LADC; Mario Ivan MD  TOPIC: BEH Group Therapy  Number of patients attending the group:  7  Group Length:  1 Hours    Group Therapy Type: Medication education    Summary of Group / Topics Discussed:    Disease of addiction and Medication management      Group Attendance:  Attended group session    Patient's response to the group topic/interactions:  cooperative with task    Patient appeared to be Attentive.        Client specific details:  Gilbert gave appropriate feedback..

## 2021-02-24 NOTE — GROUP NOTE
Group Therapy Documentation    PATIENT'S NAME: Gilbert Adams  MRN:   [id number]  :   1970  ACCT. NUMBER: [id number]  DATE OF SERVICE: 21  START TIME: 12:30 PM  END TIME:  2:30 PM  FACILITATOR(S): Darian Mclaughlin LADC  TOPIC: BEH Group Therapy  Number of patients attending the group:  5  Group Length:  2 Hours    Group Therapy Type: Recovery strategies    Summary of Group / Topics Discussed:    Spiritual Care, Balanced lifestyle, and Mindfulness/Relaxation      Group Attendance:  Attended group session    Patient's response to the group topic/interactions:  cooperative with task    Patient appeared to be Attentive.        Client specific details:  Gilbert attended PM group, and took part in a group discussion on spirituality in recovery,  and a walking meditation. Pt was attentive and engaged.

## 2021-02-25 ENCOUNTER — HOSPITAL ENCOUNTER (OUTPATIENT)
Dept: BEHAVIORAL HEALTH | Facility: CLINIC | Age: 51
End: 2021-02-25
Attending: FAMILY MEDICINE
Payer: MEDICAID

## 2021-02-25 PROCEDURE — H2035 A/D TX PROGRAM, PER HOUR: HCPCS | Mod: HQ

## 2021-02-25 PROCEDURE — 1002N00001 HC LODGING PLUS FACILITY CHARGE ADULT

## 2021-02-25 NOTE — GROUP NOTE
Group Therapy Documentation    PATIENT'S NAME: Gilbert Adams  MRN:   [id number]  :   1970  ACCT. NUMBER: [id number]  DATE OF SERVICE: 21  START TIME:  3:00 PM  END TIME:  4:00 PM  FACILITATOR(S): Patience Caballero LADC; Roberth Mishra LADC; Tiffanie Montes  TOPIC: BEH Group Therapy  Number of patients attending the group: 25  Group Length:  1 Hours    Group Therapy Type: Recovery strategies    Summary of Group / Topics Discussed:    Disease of addiction      Group Attendance:  Attended group session    Patient's response to the group topic/interactions:  cooperative with task    Patient appeared to be Attentive and Engaged.        Client specific details: Gilbert was an active participant in Skills Group presented by Dr. Melanie Olivarez.

## 2021-02-25 NOTE — GROUP NOTE
Group Therapy Documentation    PATIENT'S NAME: Gilbert Adams  MRN:   [id number]  :   1970  ACCT. NUMBER: [id number]  DATE OF SERVICE: 21  START TIME:  9:00 AM  END TIME: 11:00 AM  FACILITATOR(S): Linda Vasquez LADC; Patience Caballero LADC  TOPIC: BEH Group Therapy  Number of patients attending the group:  6  Group Length:  2 hours    Group Therapy Type: Recovery strategies    Summary of Group / Topics Discussed:    Recovery Principles      Group Attendance:  Attended group session    Patient's response to the group topic/interactions:  cooperative with task    Patient appeared to be Actively participating, Attentive and Engaged.        Client specific details:  Gilbert attended morning group therapy session.  He participated in discussions on the heroic act of conquering every day fears, and feeling appreciated.  He also participated in a meditation exercise.  Gilbert presented to the group his First Step assignment.

## 2021-02-25 NOTE — GROUP NOTE
"Group Therapy Documentation    PATIENT'S NAME: Gilbert Adams  MRN:   [id number]  :   1970  ACCT. NUMBER: [id number]  DATE OF SERVICE: 21  START TIME: 12:30 PM  END TIME:  2:30 PM  FACILITATOR(S): Roberth Mishra LADC  TOPIC: BEH Group Therapy  Number of patients attending the group:  6  Group Length:  2 Hours    Group Therapy Type: Recovery strategies and Emotion processing    Summary of Group / Topics Discussed:    Recovery Principles, Sober coping skills, Relationship/socialization, Disease of addiction, and Emotions/expression      Group Attendance:  Attended group session    Patient's response to the group topic/interactions:  cooperative with task, discussed personal experience with topic and expressed readiness to alter behaviors    Patient appeared to be Actively participating, Attentive and Engaged.        Client specific details:  Patient presented his \"Drug Use History\" assignment.  Patient displays considerable regard and dedication to group  members . His presentation detailed a significant history of trauma and personal compromise as a direct result of his use. Patient appears strongly motivated to embrace a recovery lifestyle.  "

## 2021-02-26 ENCOUNTER — HOSPITAL ENCOUNTER (OUTPATIENT)
Dept: BEHAVIORAL HEALTH | Facility: CLINIC | Age: 51
End: 2021-02-26
Attending: FAMILY MEDICINE
Payer: MEDICAID

## 2021-02-26 VITALS — TEMPERATURE: 97.9 F | OXYGEN SATURATION: 99 %

## 2021-02-26 PROCEDURE — 1002N00001 HC LODGING PLUS FACILITY CHARGE ADULT

## 2021-02-26 PROCEDURE — H2035 A/D TX PROGRAM, PER HOUR: HCPCS | Mod: HQ

## 2021-02-26 NOTE — PROGRESS NOTES
Patient met with program  to review and initiate aftercare program opportunities. Patient presents as a high risk for relapse with limited independent sober living skills.  Patient acknowledges a need to enter a program offering outpatient services. He reports that his family and living arrangement is supportive of his recovery. Patient states that he has secured employment and is seeking afternoon program options. Documentation has been forwarded to [facility].  Programming is scheduled Monday-Friday from 12:30 -2:50 pm. An intake appointment will be scheduled after documentation has been reviewed and accepted.

## 2021-02-26 NOTE — GROUP NOTE
Group Therapy Documentation    PATIENT'S NAME: Gilbert Adams  MRN:   [id number]  :   1970  ACCT. NUMBER: [id number]  DATE OF SERVICE: 21  START TIME: 12:30 PM  END TIME:  2:30 PM  FACILITATOR(S): Linda Vasquez LADC  TOPIC: BEH Group Therapy  Number of patients attending the group:  6  Group Length:  2 hours    Group Therapy Type: Recovery strategies    Summary of Group / Topics Discussed:    Recovery Principles      Group Attendance:  Attended group session    Patient's response to the group topic/interactions:  cooperative with task    Patient appeared to be Actively participating, Attentive and Engaged.        Client specific details:  Gilbert attended afternoon group therapy session.  He participated in a discussion about addiction and recovery as it relates to a film that the group watched.

## 2021-02-26 NOTE — GROUP NOTE
Group Therapy Documentation    PATIENT'S NAME: Gilbert Adams  MRN:   [id number]  :   1970  ACCT. NUMBER: [id number]  DATE OF SERVICE: 21  START TIME:  9:00 AM  END TIME: 11:00 AM  FACILITATOR(S): Linda Vasquez LADC  TOPIC: BEH Group Therapy  Number of patients attending the group:  5  Group Length:  2 hours    Group Therapy Type: Recovery strategies    Summary of Group / Topics Discussed:    Recovery Principles      Group Attendance:  Attended group session    Patient's response to the group topic/interactions:  cooperative with task    Patient appeared to be Actively participating, Attentive and Engaged.        Client specific details:  Gilbert attended morning group therapy session.  He participated in a discussion about anger and how it can affect one's self esteem.  He demonstrated support for a group member who graduated from the program and another group member who presented their First Step assignment.

## 2021-02-27 ENCOUNTER — HOSPITAL ENCOUNTER (OUTPATIENT)
Dept: BEHAVIORAL HEALTH | Facility: CLINIC | Age: 51
End: 2021-02-27
Attending: FAMILY MEDICINE
Payer: MEDICAID

## 2021-02-27 VITALS — TEMPERATURE: 98.4 F | OXYGEN SATURATION: 99 %

## 2021-02-27 VITALS — OXYGEN SATURATION: 99 % | TEMPERATURE: 97.3 F

## 2021-02-27 PROCEDURE — H2035 A/D TX PROGRAM, PER HOUR: HCPCS | Mod: HQ

## 2021-02-27 PROCEDURE — 1002N00001 HC LODGING PLUS FACILITY CHARGE ADULT

## 2021-02-27 NOTE — GROUP NOTE
Group Therapy Documentation    PATIENT'S NAME: Gilbert Adams  MRN:   [id number]  :   1970  ACCT. NUMBER: [id number]  DATE OF SERVICE: 21  START TIME: 12:30 PM  END TIME: 2:30 PM  FACILITATOR(S): Darian Mclaughlin [facility]; Carmen Hernandez [facility]; Carlyle Carrasquillo [facility]  TOPIC: BEH Group Therapy  Number of patients attending the group:  26  Group Length:  2 Hours    Group Therapy Type: Emotion processing and Daily living/independence skills    Summary of Group / Topics Discussed:    Relationship/socialization, Balanced lifestyle, and Emotions/expression      Group Attendance:  Attended group session    Patient's response to the group topic/interactions:  cooperative with task and listened actively    Patient appeared to be Actively participating, Attentive and Engaged.        Client specific details:  Gilbert learned about relationships and relationship skills.

## 2021-02-27 NOTE — GROUP NOTE
Group Therapy Documentation    PATIENT'S NAME: Gilbert Adams  MRN:   [id number]  :   1970  ACCT. NUMBER: [id number]  DATE OF SERVICE: 21  START TIME:  9:00 AM  END TIME: 11:00 AM  FACILITATOR(S): Darian Mclaughlin [facility]; Carmen Hernandez [facility]; Carlyle Carrasquillo [facility]  TOPIC: BEH Group Therapy  Number of patients attending the group:  26  Group Length:  2 Hours    Group Therapy Type: Recovery strategies    Summary of Group / Topics Discussed:    Recovery Principles, Relapse prevention, and Self-care activities      Group Attendance:  Attended group session    Patient's response to the group topic/interactions:  cooperative with task    Patient appeared to be Actively participating.        Client specific details:  Gilbert attended the AM workshop. The topic was the process of relapse and relapse prevention. Patient was attentive and engaged and took part in a group discussion.

## 2021-02-27 NOTE — ADDENDUM NOTE
Enc[facility]: 2/27/2021 7:11 AM   Actions taken: Charge Capture section accepted

## 2021-02-28 ENCOUNTER — HOSPITAL ENCOUNTER (OUTPATIENT)
Dept: BEHAVIORAL HEALTH | Facility: CLINIC | Age: 51
End: 2021-02-28
Attending: FAMILY MEDICINE
Payer: MEDICAID

## 2021-02-28 VITALS — OXYGEN SATURATION: 99 % | TEMPERATURE: 98.2 F

## 2021-02-28 PROCEDURE — H2035 A/D TX PROGRAM, PER HOUR: HCPCS | Mod: HQ

## 2021-02-28 PROCEDURE — 1002N00001 HC LODGING PLUS FACILITY CHARGE ADULT

## 2021-02-28 NOTE — GROUP NOTE
Psychoeducation Group Documentation    PATIENT'S NAME: Gilbert Adams  MRN:   [id number]  :   1970  ACCT. NUMBER: [id number]  DATE OF SERVICE: 21  START TIME:  9:00 AM  END TIME: 11:00 AM  FACILITATOR(S): Carmen Hernandez LADC  TOPIC: BEH Pyschoeducation  Number of patients attending the group:  26  Group Length:  2 Hours    Skills Group Therapy Type: Healthy behaviors development    Summary of Group / Topics Discussed:    Balanced lifestyle skills          Group Attendance:  Attended group session    Patient's response to the group topic/interactions:  cooperative with task    Patient appeared to be Attentive.         Client specific details: Pt was attentive and appropriate during RN's Lecture on TB/Hep A,B,&C this AM.

## 2021-02-28 NOTE — GROUP NOTE
Group Therapy Documentation    PATIENT'S NAME: Gilbert Adams  MRN:   [id number]  :   1970  ACCT. NUMBER: [id number]  DATE OF SERVICE: 21  START TIME:  1:00 PM  END TIME:  2:00 PM  FACILITATOR(S): Carmen Hernandez [facility]; [facility]  TOPIC: BEH Group Therapy  Number of patients attending the group: 26  Group Length:  1 Hours    Group Therapy Type: Recovery strategies    Summary of Group / Topics Discussed:    Recovery Principles, Sober coping skills, and Self-care activities      Group Attendance:  Attended group session    Patient's response to the group topic/interactions:  cooperative with task    Patient appeared to be Attentive.        Client specific details:  Gilbert attended afternoon skills group. They took part in a group exercise on those things that can support them in their ongoing recovery. They also took part in a discussion/exercise on their strengths viewed from from both themself and  their treatment peers.

## 2021-03-01 ENCOUNTER — HOSPITAL ENCOUNTER (OUTPATIENT)
Dept: BEHAVIORAL HEALTH | Facility: CLINIC | Age: 51
End: 2021-03-01
Attending: FAMILY MEDICINE
Payer: MEDICAID

## 2021-03-01 PROCEDURE — H2035 A/D TX PROGRAM, PER HOUR: HCPCS | Mod: HQ

## 2021-03-01 PROCEDURE — 1002N00001 HC LODGING PLUS FACILITY CHARGE ADULT

## 2021-03-01 NOTE — GROUP NOTE
Group Therapy Documentation    PATIENT'S NAME: Gilbert Adams  MRN:   [id number]  :   1970  ACCT. NUMBER: [id number]  DATE OF SERVICE: 3/01/21  START TIME:  9:00 AM  END TIME: 11:00 AM  FACILITATOR(S): Darian Mclaughlin LADC  TOPIC: BEH Group Therapy  Number of patients attending the group:  5  Group Length:  2 Hours    Group Therapy Type: Recovery strategies    Summary of Group / Topics Discussed:    Recovery Principles      Group Attendance:  Attended group session    Patient's response to the group topic/interactions:  cooperative with task    Patient appeared to be Attentive.        Client specific details:  Gilbert attended AM group. Patient completed a weekly check in regarding cravings, coping skills, and medical/therapy appts needed. Patient also check in with how they are feeling today and gave helpful feedback to treatment peers. .

## 2021-03-01 NOTE — GROUP NOTE
Group Therapy Documentation    PATIENT'S NAME: Gilbert Adams  MRN:   [id number]  :   1970  ACCT. NUMBER: [id number]  DATE OF SERVICE: 3/01/21  START TIME: 12:30 PM  END TIME:  2:30 PM  FACILITATOR(S): Darian Mclaughlin LADC; Minnie Phillips  TOPIC: BEH Group Therapy  Number of patients attending the group:  5  Group Length:  2 Hours    Group Therapy Type: Recovery strategies and Health and wellbeing     Summary of Group / Topics Discussed:    Spiritual Care      Group Attendance:  Attended group session    Patient's response to the group topic/interactions:  cooperative with task    Patient appeared to be Attentive.        Client specific details:  Gilbert attended PM spiritual care group. Patient took part in a discussion/exercise regarding spirituality in recovery. Patient was attentive and engaged.

## 2021-03-02 ENCOUNTER — HOSPITAL ENCOUNTER (OUTPATIENT)
Dept: BEHAVIORAL HEALTH | Facility: CLINIC | Age: 51
End: 2021-03-02
Attending: FAMILY MEDICINE
Payer: MEDICAID

## 2021-03-02 VITALS — TEMPERATURE: 97.9 F | OXYGEN SATURATION: 98 %

## 2021-03-02 PROCEDURE — H2035 A/D TX PROGRAM, PER HOUR: HCPCS | Mod: HQ

## 2021-03-02 PROCEDURE — 1002N00001 HC LODGING PLUS FACILITY CHARGE ADULT

## 2021-03-02 NOTE — GROUP NOTE
Group Therapy Documentation    PATIENT'S NAME: Gilbert Adams  MRN:   [id number]  :   1970  ACCT. NUMBER: [id number]  DATE OF SERVICE: 3/02/21  START TIME: 12:30 PM  END TIME:  2:30 PM  FACILITATOR(S): Farooq Hernandez LADC  TOPIC: BEH Group Therapy  Number of patients attending the group:  5  Group Length:  2 Hours    Group Therapy Type: Recovery strategies    Summary of Group / Topics Discussed:    Disease of addiction      Group Attendance:  Attended group session    Patient's response to the group topic/interactions:  cooperative with task    Patient appeared to be Actively participating.        Client specific details:  Gilbert participated and interacted appropriately with peers and staff in PM group. No triggers to use noted or discussed.

## 2021-03-02 NOTE — GROUP NOTE
Group Therapy Documentation    PATIENT'S NAME: Gilbert Adams  MRN:   [id number]  :   1970  ACCT. NUMBER: [id number]  DATE OF SERVICE: 3/02/21  START TIME:  9:00 AM  END TIME: 11:00 AM  FACILITATOR(S): Darian Mclaughlin LADC  TOPIC: BEH Group Therapy  Number of patients attending the group:  5  Group Length:  2 Hours    Group Therapy Type: Recovery strategies    Summary of Group / Topics Discussed:    Recovery Principles      Group Attendance:  Attended group session    Patient's response to the group topic/interactions:  cooperative with task    Patient appeared to be Attentive.        Client specific details:  Gilbert attended AM group. Patient talked in group about those things that are in other people that she can relate. Patient checked in, took part in a mindfulness exercise, and gave feedback to a peer's assignment presentation.

## 2021-03-02 NOTE — GROUP NOTE
Group Therapy Documentation    PATIENT'S NAME: Gilbert Adams  MRN:   [id number]  :   1970  ACCT. NUMBER: [id number]  DATE OF SERVICE: 3/02/21  START TIME:  3:00 PM  END TIME:  4:00 PM  FACILITATOR(S): Roberth Mishra LADC; Hannah Barclay LADC; Tiffanie Montes  TOPIC: BEH Group Therapy  Number of patients attending the group:  27  Group Length:  1 Hours    Group Therapy Type: Recovery strategies    Summary of Group / Topics Discussed:    Recovery Principles, Sober coping skills, Relationship/socialization, and Balanced lifestyle      Group Attendance:  Attended group session    Patient's response to the group topic/interactions:  cooperative with task    Patient appeared to be Actively participating, Attentive and Engaged.        Client specific details:  Patient actively participated.

## 2021-03-02 NOTE — PROGRESS NOTES
Patient has a scheduled intake appointment with Reyna on Thursday 3/11 @ 9:30 am. Location is [address] [place] [address]. [phone]. Only the intake/orientation appointment is in person. All group therapy is virtual.

## 2021-03-02 NOTE — PROGRESS NOTES
Patient:  Gilbert Adams    Day Monday Tuesday Wednesday Thursday Friday Saturday Nilay   Group Hours   4 hours 4 hours 5 hours 4 hours 4 hours 4 hours 3 hours   Skills Hours   0 hours 1.0 hours 0 hours 1.0 hours 0 hours 0 hours 0 hours   Individual Session (LADC)    0 hours 0 hours 0 hours 0 hours 0 hours 0 hours 0 hours   Individual Session  (psychotherapy)   0 hours 0 hours 0 hours 0 hours 0 hours 0 hours 0 hours   Peer-led Recovery Group   1.0 hours 1.0 hours 1.0 hours 1.0 hours 1.0 hours 1.0 hours 1.0 hours                    Adult CD Progress Note and Treatment Plan Review     Attendance  Please refer to OP BEH CD Adult Attendance Record Documentation Flowsheet    Support group attended this week: Yes     Reporting sobriety: Yes    Treatment Plan     Treatment Plan Review competed on: 3/2/2021      Staff Members contributing: Farooq Hernandez [facility]; RUTH Espino; Anne Nunes                        Received Supervision: Anne Nunes    Client: Pt contributed to goals and plan.    Client received copy of plan/revised plan: Yes     Client agrees with plan/revised plan: Yes      Changes to Treatment Plan: None at this time    New Goals added since last review: Current    Goals worked on since last review: Aftercare planning, sobriety, tx plan assignments, group therapy, build sober support network, psychoeducation.    Strategies effective: Yes     Strategies need these changes: None at this time     1) Care Coordination Activities: Pt expressed an interest in intensive outpatient program at at [facility] and sober support meetings in the community.   2) Medical, Mental Health, and other appointments the client attended: Pt reported having a tooth pulled this past week.  3) Medication issues: No issues reported.   4) Physical and mental health problems: Pt reported no concerns at this time, other than recent dental extraction.  5) Any changes in Vulnerable Adult Status? No. If yes, add  "to treatment plan and individual abuse prevention plan.  6) Review and evaluation of the individual abuse prevention plan: Current IAPP for this program is adequate for this client.    ASAM Risk Rating:    Dimension 1 0: Pt reported his sober date as 1/30/2021. Pt reported no PAWS symptomatology this past week. Pt will be monitored.     Dimension 2 0: Pt denied having medical or medication issues this past week. Pt did not attend any healthcare appointments this past week and denied scheduling any appointments for the future.    Dimension 3 1: Pt denied having suicidal thoughts at this time. Pt reported some changes in his mood due to feeling like he's getting closer to the end of his treatment this past week. Pt reported some stress this past week regarding the death of a friend. He also reported that his wife is struggling with some things while patient is in treatment. Pt reported using as \"staying positive, occupying my time and mind with more productive positive things\" as his coping techniques for dealing with difficult emotions this past week. Pt reported no triggers to use this past week.     Dimension 4 0: Pt expresses good internal motivation for change. Pt is active in group process, accepts and provides feedback, has good insight, and appears engaged. Pt is supportive of his group peers. Pt reported that \"feeling human and hopeful\" are what motivated him to be sober and to stay in treatment this week.Patient  presented his  \"first step and drug use history\".      Dimension 5 4: Pt reported that his cravings were at a 0 this past week on a scale of 1 to 10, 10 being the highest/ most severe. Pt reported \"staying on track, and knowing that I just don't want that for myself anymore\"was used to manage cravings this past week.  Patient is working on assignments: \"Self -Sabotage\" and \"Relapse Prevention Plan\".    Dimension 6 3: Pt expressed an interest in intensive outpatient program at at [facility] " "and sober support meetings in the community. Patient attended the Relationships weekend workshop.    Guide to C-SSRS Risk Ratings   NO IDEATION:  with no active thoughts IDEATION: with a wish to die. IDEATION: with active thoughts. Risk Ratings   If Yes No No 0 - Very Low Risk   If NA Yes No 1 - Low Risk   If NA Yes Yes 2 - Low/moderate risk   IDEATION: associated thoughts of methods without intent or plan INTENT: Intent to follow through on suicide PLAN: Plan to follow through on suicide Risk Ratings cont...   If Yes No No 3 - Moderate Risk   If Yes Yes No 4 - High Risk   If Yes Yes Yes 5 - High Risk   The patient's ADDITIONAL RISK FACTORS and lack of PROTECTIVE FACTORS may increase their overall suicide risk ratings.     Pt's current risk rating: \"Very Low Risk\"    Any changes in Vulnerable Adult Status? No.  If yes, add to treatment plan and individual abuse prevention plan.    Family Involvement:   No in person visits this week due to Covid hospital restrictions/precautions.    Data:   Pt offered feedback, had good insight, and patient actively participated in group. Pt shares openly during group check-in. Pt is very vocal and transparent in groups.       Pt is also gaining trust of peers and counselors.       Intervention:   Counselor feedback  Group feedback  Relapse prevention  Aftercare planning  Cognitive behavior therapy  Counselor feedback  Education  Emotional management  Motivational enhancement therapy   Twelve Step facilitation  Mental health education  Pt and counselor reviewed and signed ISP and assessment summary.      Assessment:   Stages of Change Model  Contemplation     Appears/ Sounds:  Cooperative  Motivated  Engaged      Plan:  Focus on recovery environment  Monitor emotional/physical health    Continue group therapy, go to AA/NA meetings when available, work with sponsor, build sober support network, engage in daily structured activities, and have sober fun.            Lizbeth" LADC

## 2021-03-03 ENCOUNTER — HOSPITAL ENCOUNTER (OUTPATIENT)
Dept: BEHAVIORAL HEALTH | Facility: CLINIC | Age: 51
End: 2021-03-03
Attending: FAMILY MEDICINE
Payer: MEDICAID

## 2021-03-03 VITALS — TEMPERATURE: 97.7 F | OXYGEN SATURATION: 99 %

## 2021-03-03 PROCEDURE — H2035 A/D TX PROGRAM, PER HOUR: HCPCS | Mod: HQ

## 2021-03-03 PROCEDURE — 1002N00001 HC LODGING PLUS FACILITY CHARGE ADULT

## 2021-03-03 NOTE — GROUP NOTE
Group Therapy Documentation    PATIENT'S NAME: Gilbert Adams  MRN:   [id number]  :   1970  ACCT. NUMBER: [id number]  DATE OF SERVICE: 3/03/21  START TIME: 12:30 PM  END TIME:  2:30 PM  FACILITATOR(S): Farooq Hernandez LADC  TOPIC: BEH Group Therapy  Number of patients attending the group:  6  Group Length:  2 Hours    Group Therapy Type: Health and wellbeing     Summary of Group / Topics Discussed:    Spiritual Care      Group Attendance:  Attended group session    Patient's response to the group topic/interactions:  cooperative with task    Patient appeared to be Actively participating.        Client specific details:  Gilbert .participated and interacted appropriately with peers and staff in PM group. No triggers to use noted or discussed

## 2021-03-03 NOTE — GROUP NOTE
Group Therapy Documentation    PATIENT'S NAME: Gilbert Adams  MRN:   [id number]  :   1970  ACCT. NUMBER: [id number]  DATE OF SERVICE: 3/03/21  START TIME:  8:30 AM  END TIME:  9:30 AM  FACILITATOR(S): Gamal Rosario LADC  TOPIC: BEH Group Therapy  Number of patients attending the group:  6  Group Length:  1 Hours    Group Therapy Type: Recovery strategies    Summary of Group / Topics Discussed:    Cognitive behavioral therapy skills      Group Attendance:  Attended group session    Patient's response to the group topic/interactions:  cooperative with task    Patient appeared to be Attentive.        Client specific details:  Gilbert gave appropriate feedback..

## 2021-03-03 NOTE — GROUP NOTE
Group Therapy Documentation    PATIENT'S NAME: Gilbert Adams  MRN:   [id number]  :   1970  ACCT. NUMBER: [id number]  DATE OF SERVICE: 3/03/21  START TIME:  9:45 AM  END TIME: 11:15 AM  FACILITATOR(S): Darian Mclaughlin LADC  TOPIC: BEH Group Therapy  Number of patients attending the group:  6  Group Length:  1 1/2 Hours    Group Therapy Type: Recovery strategies    Summary of Group / Topics Discussed:    Recovery Principles      Group Attendance:  Attended group session    Patient's response to the group topic/interactions:  cooperative with task    Patient appeared to be Attentive.        Client specific details:  Gilbert attended AM group. atient checked in and took part in a group discussion on complacency. Patient was attentive and engaged.

## 2021-03-04 ENCOUNTER — HOSPITAL ENCOUNTER (OUTPATIENT)
Dept: BEHAVIORAL HEALTH | Facility: CLINIC | Age: 51
End: 2021-03-04
Attending: FAMILY MEDICINE
Payer: MEDICAID

## 2021-03-04 VITALS — TEMPERATURE: 97.7 F | OXYGEN SATURATION: 100 %

## 2021-03-04 PROCEDURE — 1002N00001 HC LODGING PLUS FACILITY CHARGE ADULT

## 2021-03-04 PROCEDURE — H2035 A/D TX PROGRAM, PER HOUR: HCPCS | Mod: HQ

## 2021-03-04 NOTE — GROUP NOTE
Group Therapy Documentation    PATIENT'S NAME: Gilbert Adams  MRN:   [id number]  :   1970  ACCT. NUMBER: [id number]  DATE OF SERVICE: 3/04/21  START TIME:  3:00 PM  END TIME:  4:00 PM  FACILITATOR(S): Patience Caballero LADC; Thelma Bartholomew LADC; Roberth Mishra [facility]; Hannah Barclay LADC  TOPIC: BEH Group Therapy  Number of patients attending the group: 24  Group Length:  1 Hours    Group Therapy Type: Recovery strategies    Summary of Group / Topics Discussed:    Disease of addiction      Group Attendance:  Attended group session    Patient's response to the group topic/interactions:  cooperative with task    Patient appeared to be Attentive and Engaged.        Client specific details: Gilbert participated in Skills Group on gambling and personal finances, led by Thelma Bartholomew.

## 2021-03-04 NOTE — GROUP NOTE
Group Therapy Documentation    PATIENT'S NAME: Gilbert Adams  MRN:   [id number]  :   1970  ACCT. NUMBER: [id number]  DATE OF SERVICE: 3/04/21  START TIME:  9:00 AM  END TIME: 11:00 AM  FACILITATOR(S): Hannah Barclay ADC-T  TOPIC: BEH Group Therapy  Number of patients attending the group: 6  Group Length:  2 Hours    Group Therapy Type: Recovery strategies and Emotion processing    Summary of Group / Topics Discussed:    Recovery Principles, Relationship/socialization, and Emotions/expression    Group Attendance:  Attended group session    Patient's response to the group topic/interactions:  cooperative with task, gave appropriate feedback to peers and listened actively    Patient appeared to be Actively participating, Attentive and Engaged.        Client specific details:  During check-in pt reported feeling excited. He shared that the artist who has most inspired him is Ashwin Vilchis. He participated in a group discussion of boundaries and offered appropriate feedback to his peer on her drug use history assignment.

## 2021-03-04 NOTE — GROUP NOTE
Group Therapy Documentation    PATIENT'S NAME: Gilbert Adams  MRN:   [id number]  :   1970  ACCT. NUMBER: [id number]  DATE OF SERVICE: 3/04/21  START TIME: 12:30 PM  END TIME:  2:30 PM  FACILITATOR(S): Farooq Hernandez LADC  TOPIC: BEH Group Therapy  Number of patients attending the group:  6  Group Length:  2 Hours    Group Therapy Type: Recovery strategies    Summary of Group / Topics Discussed:    Recovery Principles      Group Attendance:  Attended group session    Patient's response to the group topic/interactions:  cooperative with task    Patient appeared to be Actively participating.        Client specific details:  Gilbert participated and interacted appropriately with peers and staff in PM group. No triggers to use noted or discussed.

## 2021-03-05 ENCOUNTER — HOSPITAL ENCOUNTER (OUTPATIENT)
Dept: BEHAVIORAL HEALTH | Facility: CLINIC | Age: 51
End: 2021-03-05
Attending: FAMILY MEDICINE
Payer: MEDICAID

## 2021-03-05 VITALS — OXYGEN SATURATION: 100 % | TEMPERATURE: 97.7 F

## 2021-03-05 PROCEDURE — 1002N00001 HC LODGING PLUS FACILITY CHARGE ADULT

## 2021-03-05 PROCEDURE — H2035 A/D TX PROGRAM, PER HOUR: HCPCS | Mod: HQ

## 2021-03-05 NOTE — GROUP NOTE
Group Therapy Documentation    PATIENT'S NAME: Gilbert Adams  MRN:   [id number]  :   1970  ACCT. NUMBER: [id number]  DATE OF SERVICE: 3/05/21  START TIME: 12:30 PM  END TIME:  2:30 PM  FACILITATOR(S): Linda Vasquez LADC; Farooq Hernandez LADC  TOPIC: BEH Group Therapy  Number of patients attending the group:  6  Group Length:  2 hours    Group Therapy Type: Recovery strategies    Summary of Group / Topics Discussed:    Recovery Principles      Group Attendance:  Attended group session    Patient's response to the group topic/interactions:  cooperative with task    Patient appeared to be Actively participating, Attentive and Engaged.        Client specific details:  Gilbert attended afternoon group therapy session.  He participated in a discussion on the 12 steps as they relate to a film that the group watched.

## 2021-03-05 NOTE — GROUP NOTE
Group Therapy Documentation    PATIENT'S NAME: Gilbert Adams  MRN:   [id number]  :   1970  ACCT. NUMBER: [id number]  DATE OF SERVICE: 3/05/21  START TIME:  9:00 AM  END TIME: 11:00 AM  FACILITATOR(S): Darian Mclaughlin LADC  TOPIC: BEH Group Therapy  Number of patients attending the group:  5  Group Length:  2 Hours    Group Therapy Type: Recovery strategies    Summary of Group / Topics Discussed:    Recovery Principles      Group Attendance:  Attended group session    Patient's response to the group topic/interactions:  cooperative with task    Patient appeared to be Attentive.        Client specific details:  Gilbert attended AM group. Patient was attentive and engaged. Patient discussed a reading about creating your life story. Patient checked in, did a short breathing relaxation, and an exercise on developing discrepancy.

## 2021-03-06 ENCOUNTER — HOSPITAL ENCOUNTER (OUTPATIENT)
Dept: BEHAVIORAL HEALTH | Facility: CLINIC | Age: 51
End: 2021-03-06
Attending: FAMILY MEDICINE
Payer: MEDICAID

## 2021-03-06 VITALS — TEMPERATURE: 98.2 F | OXYGEN SATURATION: 98 %

## 2021-03-06 PROCEDURE — 1002N00001 HC LODGING PLUS FACILITY CHARGE ADULT

## 2021-03-06 PROCEDURE — H2035 A/D TX PROGRAM, PER HOUR: HCPCS | Mod: HQ

## 2021-03-06 RX ORDER — NICOTINE 21 MG/24HR
1 PATCH, TRANSDERMAL 24 HOURS TRANSDERMAL EVERY 24 HOURS
COMMUNITY

## 2021-03-06 NOTE — IP AVS SNAPSHOT
MRN:[id number]                      After Visit Summary   3/6/2021    Gilbert Adams    MRN: [id number]           Visit Information        Provider Department      3/6/2021  7:00 AM ADULT LODGING PLUS A [facility]        Your next 10 appointments already scheduled    Mar 07, 2021  7:00 AM  Treatment with ADULT LODGING PLUS A  [facility] ([facility] ) [address] [place] [id number] [phone]      Mar 08, 2021  7:00 AM  Treatment with ADULT LODGING PLUS A  [facility] ([facility] ) [address] [place] [id number] [phone]      Mar 09, 2021  7:00 AM  Treatment with ADULT LODGING PLUS A  [facility] ([facility] ) [address] [place] [id number] [phone]         MyChart Information    [facility] gives you secure access to your electronic health record. If you see a primary care provider, you can also send messages to your care team and make appointments. If you have questions, please call your primary care clinic.  If you do not have a primary care provider, please call [phone] and they will assist you.       Care EveryWhere ID    This is your Care EveryWhere ID. This could be used by other organizations to access your [facility] medical records  [id number]       Equal Access to Services    RASHI ELIZONDO : Hadii aad ku hadasho Soomaali, waaxda luqadaha, qaybta kaalmada adeegyada, mundo singh. So [facility] [phone].    ATENCIÓN: Si habla español, tiene a murdock disposición servicios gratuitos de asistencia lingüística. Llame al [phone].    We comply with applicable  federal and state civil rights laws, including the [place] Human Rights Act. We do not discriminate on the basis of race, color, creed, Zoroastrian, national origin, marital status, age, disability, sex, sexual orientation, or gender identity.    If you would like an itemization of your charges they will now be available in [employer] 30 days after discharge. To access the itemized statements in [employer] go to billing/billing summary. From there select view account. There will be multiple tabs showing an overview of your account, detail, payments, and communications. From the communications tab you can see your monthly statements, your itemized statements, and any billing letters generated for your account. If you do not have a [employer] account and need help getting access please contact [employer] support at [phone].  If you would prefer to have your itemized statements mailed please contact our automated itemized bill request line at [phone] option  2.

## 2021-03-06 NOTE — GROUP NOTE
Group Therapy Documentation    PATIENT'S NAME: Gilbert Adams  MRN:   [id number]  :   1970  ACCT. NUMBER: [id number]  DATE OF SERVICE: 3/06/21  START TIME: 12:30 PM  END TIME:  2:30 PM  FACILITATOR(S): Tiffanie Montes; Heena Purvis LADC  TOPIC: BEH Group Therapy  Number of patients attending the group:  7  Group Length:  2 Hours    Group Therapy Type: Recovery strategies    Summary of Group / Topics Discussed:    Recovery Principles, Sober coping skills, Disease of addiction, and Relapse prevention      Group Attendance:  Attended group session    Patient's response to the group topic/interactions:  cooperative with task and discussed personal experience with topic    Patient appeared to be Attentive and Engaged.        Client specific details:  Patient participated in relapse prevention workshop and completed activities. Patient was engaged and shared  voice of addiction.

## 2021-03-06 NOTE — GROUP NOTE
Group Therapy Documentation    PATIENT'S NAME: Gilbert Adams  MRN:   [id number]  :   1970  ACCT. NUMBER: [id number]  DATE OF SERVICE: 3/06/21  START TIME:  9:00 AM  END TIME: 11:00 AM  FACILITATOR(S): Tawanna Clifton LADC  TOPIC: BEH Group Therapy  Number of patients attending the group:  7  Group Length:  2 Hours    Group Therapy Type: Recovery strategies    Summary of Group / Topics Discussed:    Relapse prevention      Group Attendance:  Attended group session    Patient's response to the group topic/interactions:  cooperative with task    Patient appeared to be Engaged.        Client specific details:  Gilbert participated in a workshop on mental health and relapse prevention. Gilbert participated in a group discussion on generalized anxiety and substance use disorders.  .

## 2021-03-06 NOTE — IP AVS SNAPSHOT
Medication List       Patient: JOHNNY WOOD   : May 18, 1970   Physician: (Not on file)           This is your record.  Keep this with you and show to your community pharmacist(s) and physician(s) at each visit.     Allergies:  No Known Allergies          Medications  Valid as of: 2021 -  7:47 AM    Generic Name Brand Name Tablet Size Instructions for use    Calcium Carbonate Antacid calcium carbonate 750 MG Take 750 mg by mouth daily as needed Take 2-4 tabs as needed, do not exceed more than 6 tabs in 24 hours. Do not use max dose for more than 2 weeks        Naproxen Sodium   Take by mouth as needed        Nicotine NICODERM CQ 21 MG/24HR Place 1 patch onto the skin every 24 hours        Nicotine Polacrilex NICORETTE 4 MG Place 4 mg inside cheek as needed for smoking cessation        raNITIdine HCl ZANTAC 150 MG Take 1 tablet (150 mg) by mouth daily        .           .           .           .

## 2021-03-07 ENCOUNTER — HOSPITAL ENCOUNTER (OUTPATIENT)
Dept: BEHAVIORAL HEALTH | Facility: CLINIC | Age: 51
End: 2021-03-07
Attending: FAMILY MEDICINE
Payer: MEDICAID

## 2021-03-07 VITALS — OXYGEN SATURATION: 98 % | TEMPERATURE: 97.6 F

## 2021-03-07 PROCEDURE — 1002N00001 HC LODGING PLUS FACILITY CHARGE ADULT

## 2021-03-07 PROCEDURE — H2035 A/D TX PROGRAM, PER HOUR: HCPCS | Mod: HQ

## 2021-03-07 NOTE — GROUP NOTE
Group Therapy Documentation    PATIENT'S NAME: Gilbert Adams  MRN:   [id number]  :   1970  ACCT. NUMBER: [id number]  DATE OF SERVICE: 3/07/21  START TIME:  8:30 AM  END TIME: 10:30 AM  FACILITATOR(S): Tiffanie Montes  TOPIC: BEH Group Therapy  Number of patients attending the group:  28  Group Length:  2 Hours    Group Therapy Type: Recovery strategies, Daily living/independence skills, and Health and wellbeing     Summary of Group / Topics Discussed:    Self-care activities      Group Attendance:  Attended group session    Patient's response to the group topic/interactions:  cooperative with task    Patient appeared to be Actively participating, Attentive and Engaged.        Client specific details:  Patient was attentive and participative during lecturer..

## 2021-03-07 NOTE — GROUP NOTE
Group Therapy Documentation    PATIENT'S NAME: Gilbert Adams  MRN:   [id number]  :   1970  ACCT. NUMBER: [id number]  DATE OF SERVICE: 3/07/21  START TIME: 12:30 PM  END TIME:  1:30 PM  FACILITATOR(S): Heena Purvis LADC; Tiffanie Montes  TOPIC: BEH Group Therapy  Number of patients attending the group:  7  Group Length:  1 Hours    Group Therapy Type: Recovery strategies    Summary of Group / Topics Discussed:    Tobacco Cessation      Group Attendance:  Attended group session    Patient's response to the group topic/interactions:  cooperative with task    Patient appeared to be Actively participating, Attentive and Engaged.        Client specific details:  Patient attended and participated in the tobacco cessation presentation. Patient seemed receptive to information and education.

## 2021-03-08 ENCOUNTER — HOSPITAL ENCOUNTER (OUTPATIENT)
Dept: BEHAVIORAL HEALTH | Facility: CLINIC | Age: 51
End: 2021-03-08
Attending: FAMILY MEDICINE
Payer: MEDICAID

## 2021-03-08 VITALS — OXYGEN SATURATION: 99 % | TEMPERATURE: 98 F

## 2021-03-08 PROCEDURE — H2035 A/D TX PROGRAM, PER HOUR: HCPCS | Mod: HQ

## 2021-03-08 PROCEDURE — 1002N00001 HC LODGING PLUS FACILITY CHARGE ADULT

## 2021-03-08 NOTE — PROGRESS NOTES
[facility]     Patient: Gilbert Adams  MRN: [id number]   : 1970 Age: 50 year old Sex: male   -  Focus of Treatment / Discharge Recommendations    Personal Safety/ Management of Symptoms    * Follow your safety plan.  Report increased symptoms to your care team and /or go to the nearest Emergency Department.    * Call crisis lines as needed    [facility] [phone]                [facility] [phone]  [facility] [phone]              [facility] [phone]  [facility] [phone]              [facility] [phone]  [facility] [phone]          National Suicide Prevention [phone]  [facility] [phone]            Suicide Prevention [phone]  [facility] [phone]    Abstinence/Relapse Prevention  * Take all medicines as directed.  Carry a current list of medicines with you.  * Use coping skills: talk to supportive and sober friends, family, and peers. Exercise, relaxation, mindfulness, reading, laughing, getting out in nature.   * Do not use illicit (street) drugs, controlled substances (narcotics) or alcohol.    Develop/Improve Independent Living/Socialization Skills: continue to build upon sober living [facility] [place]    Community Resources/Supports and Discharge Planning:    Patient has a scheduled intake appointment with Reyna on Thursday 3/11 @ 9:30 am. Location is [address] [place] [address]. [phone]. Only the intake/orientation appointment is in person. All group therapy is virtual.    See your medical doctor for meds and as needed.          Client Signature:_______________________   Date / Time:___________  Staff Signature:________________________   Date / Time:___________

## 2021-03-08 NOTE — PROGRESS NOTES
Nursing Discharge Planning Meeting    Writer completed discharge planning meeting with patient. Discharge is planned for 3/9/21    Discussed appropriate follow up care to manage CARLA, MI and Medical and to obtain medication refills. Patient given a copy of their current medications for reference. Questions were answered at this time and the patient verbalized an understanding of the post-discharge follow up plan.    Patient to schedule an appointment with their PCP-Pt uses [facility], but is still working on insurance coverage.     Continue to support patient in discharge planning as needed to assure appropriate continuity of care.     Tobacco Cessation  Patient participated in the nicotine replacement therapy for tobacco cessation or reduction during their treatment programming: Yes, pt decreased tobacco use with NRT products    The patient was provided with community resources for follow-up to continue tobacco cessation support once in the community. Also the patient was encoruaged to discuss their tobacco cessation efforts with the primary care provider.

## 2021-03-08 NOTE — GROUP NOTE
Group Therapy Documentation    PATIENT'S NAME: Gilbert Adams  MRN:   [id number]  :   1970  ACCT. NUMBER: [id number]  DATE OF SERVICE: 3/08/21  START TIME:  9:00 AM  END TIME: 11:00 AM  FACILITATOR(S): Linda Vasquez LADC; Darian Mclaughlin LADC  TOPIC: BEH Group Therapy  Number of patients attending the group:  6  Group Length:  2 hours    Group Therapy Type: Recovery strategies    Summary of Group / Topics Discussed:    Recovery Principles      Group Attendance:  Attended group session    Patient's response to the group topic/interactions:  cooperative with task    Patient appeared to be Actively participating, Attentive and Engaged.        Client specific details:  Gilbert attended morning group therapy session.  He participated in discussions on optimism vs pessimism, and who are our best friends and support.  Gilbert demonstrated support for another group member who presented their drug use history by asking questions and providing positive feedback.

## 2021-03-08 NOTE — GROUP NOTE
Group Therapy Documentation    PATIENT'S NAME: Gilbert Adams  MRN:   [id number]  :   1970  ACCT. NUMBER: [id number]  DATE OF SERVICE: 3/08/21  START TIME: 12:30 PM  END TIME:  2:30 PM  FACILITATOR(S): Darian Mclaughlin LADC; Minnie Phillips  TOPIC: BEH Group Therapy  Number of patients attending the group:  6  Group Length:  2 Hours    Group Therapy Type: Recovery strategies    Summary of Group / Topics Discussed:    Spiritual Care      Group Attendance:  Attended group session    Patient's response to the group topic/interactions:  cooperative with task    Patient appeared to be Attentive.        Client specific details:  Gilbert attended the afternoon Spiritual Care group. The topics included: finding spirituality and meaning in one's recovery life.

## 2021-03-08 NOTE — PROGRESS NOTES
[address] and [address]oors  [facility] [place] [address]              Gilbert Adams, 1970, was admitted for evaluation/treatment of chemical dependency at [facility].  This person took part in these program(s):     ______ The Inpatient Program   ______ The Outpatient Program   ___X___ The [facility]   ______ [facility] Outpatient         Date admitted: 2/10/2021  Date discharged: 3/9/2021     Type of discharge:   ___X___ Satisfactory - completed evaluation / treatment   __________ Discharged without completing   ______ Behavioral discharge   ______ Transferred to another chemical dependency program   ______ Transferred to another type of service   ______ Left against medical advice (AMA) / Eloped               Counselor:  DANILO Espino and DANILO Rodríguez      Date: 3/9/2021            Time: 7:08 AM

## 2021-03-09 ENCOUNTER — HOSPITAL ENCOUNTER (OUTPATIENT)
Dept: BEHAVIORAL HEALTH | Facility: CLINIC | Age: 51
End: 2021-03-09
Attending: FAMILY MEDICINE
Payer: MEDICAID

## 2021-03-09 VITALS — OXYGEN SATURATION: 99 % | TEMPERATURE: 98 F

## 2021-03-09 PROCEDURE — H2035 A/D TX PROGRAM, PER HOUR: HCPCS | Mod: HQ

## 2021-03-09 NOTE — GROUP NOTE
"Group Therapy Documentation    PATIENT'S NAME: Gilbert Adams  MRN:   [id number]  :   1970  ACCT. NUMBER: [id number]  DATE OF SERVICE: 3/09/21  START TIME:  9:00 AM  END TIME: 11:00 AM  FACILITATOR(S): Darian Mclaughlin LADC  TOPIC: BEH Group Therapy  Number of patients attending the group:  7  Group Length:  2 Hours    Group Therapy Type: Recovery strategies    Summary of Group / Topics Discussed:    Recovery Principles      Group Attendance:  Attended group session    Patient's response to the group topic/interactions:  cooperative with task    Patient appeared to be Attentive.        Client specific details:  Gilbert  attended AM group. Patient took part in a short mindfulness exercise. They also checked in and presented an assignment on \"Resentments\".   "

## 2021-03-09 NOTE — PROGRESS NOTES
CHEMICAL DEPENDENCY DISCHARGE SUMMARY    PATIENT NAME:  Gilbert Adams  :  1970    EVALUATION COUNSELOR: CONI Posey, [facility] [place]  TREATMENT COUNSELORS: Farooq Hernandez, [facility] [place],  Darian Mclaughlin  [facility] [place]  REFERRAL SOURCE:  [facility]  PROGRAM:  [facility]  ADMISSION DATE: 2/10/21  DATE OF LAST SESSION: 3/9/21  DISCHARGE DATE: 3/9/21  ADMISSION DIAGNOSIS:    Alcohol Use Disorder Severe - 303.90 (F10.20)  Cocaine Use Disorder Moderate - 304.20 (F14.20)  Tobacco Use Disorder Severe - 305.10 (F17.200)    DISCHARGE DIAGNOSIS:  Alcohol Use Disorder Severe - 303.90 (F10.20)  Cocaine Use Disorder Moderate - 304.20 (F14.20)  Tobacco Use Disorder Severe - 305.10 (F17.200)    DISCHARGE STATUS: Patient completed treatment with staff approval.   LAST USE DATE: Patient reported last date of use for alcohol and cocaine as 21.  DAYS OF TREATMENT COMPLETED:  Patient completed 27 days of treatment    PRESENTING INFORMATION:  Gilbert was assessed to be appropriate for [facility] treatment services.       SERVICES PROVIDED:  Services included assessment, treatment planning and education regarding chemical dependency, mental health, relationships, and relapse prevention.  The patient also participated in individual therapy, group therapy, recovery oriented workshops, spiritual care counseling, recovery skills training, and aftercare planning.    ISSUES ADDRESS IN TREATMENT:    DIMENSION 1 - ACUTE INTOXICATION/WITHDRAWAL POTENTIAL  ADMISSION RISK RATIN  DISCHARGE RISK RATIN  Patient entered into [facility] on 2/10/21 . Patient reported last date of use as 21. Throughout treatment patient denied any withdrawal symptoms that would interfere with full participation in treatment programming.     DIMENSION 2 - BIOMEDICAL COMPLICATIONS AND CONDITIONS  ADMISSION RISK RATIN  DISCHARGE RISK RATIN  Upon admissions patient denied any medical concerns that would  "interfere with full participation in treatment programming. Patient maintain medication compliance throughout treatment. Upon discharge patient appeared able to access medical aid as needed. Patient has a history of HEP C,  seizure disorder, and TBI.     DIMENSION 3 - EMOTIONAL, BEHAVIORAL, COGNITIVE CONDITIONS AND COMPLICATIONS  ADMISSION RISK RATIN  DISCHARGE RISK RATIN  Upon admissions patient reported a mental health diagnosis of Anxiety. Upon admissions patient was given a suicidal risk screening. Patient was rated as \"low risk\". Upon discharge patient denied any suicidal thoughts or ideations. Patient's initial clinical global impression was 5, and their discharge cgi was 3. Patient took part in two weekly Spiritual Care sessions with Minnie Phillips. Patient completed and presented the assignment,  Resentments . Patient completed,  Anxiety Disorder and Substance Abuse .    DIMENSION 4 - READINESS FOR CHANGE  ADMISSION RISK RATIN  DISCHARGE RISK RATIN  Patient appeared motivated for recovery. Patient completed the assignments: Drug Use History  and his  First Step\" assignment. Patient appears to be in the preparation Stage of Change.     DIMENSION 5 - RELAPSE, CONTINUED USE AND CONTINUED PROBLEM POTENTIAL   ADMISSION RISK RATIN  DISCHARGE RISK RATIN  Patient has a history of relapse and reports 3 previous treatments. Patient attended multiple Relapse prevention weekend workshop and learned/reviewed their relapse triggers. Patient also took part in \"voice of addiction\" presentation. Patient completed the assignments:  \"Self -Sabotage\" and\"Relapse Prevention Plan\".     DIMENSION 6 - RECOVERY ENVIRONMENT  ADMISSION RISK RATING: 3  DISCHARGE RISK RATIN  Patient began building a sober support network while in treatment. Patient attended multiple Relationship weekend workshops and learned about healthy/unhealthy relationships, assertive communication, boundaries, and co-dependency. " Patient attended multiple recovery meetings held in the evenings while in LP. Patient also has been in touch with a potential sponsor and plans on working with him on the 12 Steps post discharge. Patient has agreed to attend IOP at [facility] [address] starting with an intake on 3/11 at 9:30 AM.     STRENGTHS: Patient maintained a positive attitude while in treatment. Patient was an active participant in group therapy and was open for feedback from their counselors and peers. Patient appears motivated for recovery at this time and willing to incorporate positive changes into their  life.     LIVING ARRANGEMENTS AT DISCHARGE: with wife    PROGNOSIS:  Prognosis for this patient is favorable at this time.      CONTINUING CARE RECOMMENDATIONS AND REFERRALS:    1.  Abstain from all mood-altering chemicals unless prescribed by a licensed medical provider, and take all medications as prescribed.  2.  Attend a minimum of three AA/NA/Sober support groups weekly in the community/online.  3.  Begin working with a sponsor and maintain regular contact with them.  4.  Admit immediately into [facility] ([address]) starting on  3/11 and follow all recommendations.  5.  Continue to invest in building a sober support network.  6.  Continue to monitor and understand relapse triggers and stressors and implement, and continue to development, healthy coping skills.  7.   Attend all scheduled medical appointments.        This information has been disclosed to you from records protected by Federal confidentiality rules (42 CFR part 2). The Federal rules prohibit you from making any further disclosure of this information unless further disclosure is expressly permitted by the written consent of the person to whom it pertains or as otherwise permitted by 42 CFR part 2. A general authorization for the release of medical or other information is NOT sufficient for this purpose. The Federal rules restrict any use of the information to  criminally investigate or prosecute any alcohol or drug abuse patient.       RUTH Espino

## 2021-03-09 NOTE — GROUP NOTE
Group Therapy Documentation    PATIENT'S NAME: Gilbert Adams  MRN:   [id number]  :   1970  ACCT. NUMBER: [id number]  DATE OF SERVICE: 3/09/21  START TIME: 12:30 PM  END TIME:  2:30 PM  FACILITATOR(S): Farooq Hernandez LADC  TOPIC: BEH Group Therapy  Number of patients attending the group:  7  Group Length:  2 Hours    Group Therapy Type: Emotion processing    Summary of Group / Topics Discussed:    Disease of addiction      Group Attendance:  Attended group session    Patient's response to the group topic/interactions:  cooperative with task    Patient appeared to be Actively participating.        Client specific details:  Gilbert participated and interacted appropriately with peers and staff in PM group. No triggers to use noted or discussed. Pt participated in his graduation in PM group.

## 2021-10-24 ENCOUNTER — HEALTH MAINTENANCE LETTER (OUTPATIENT)
Age: 51
End: 2021-10-24

## 2022-02-13 ENCOUNTER — HEALTH MAINTENANCE LETTER (OUTPATIENT)
Age: 52
End: 2022-02-13

## 2022-10-15 ENCOUNTER — HEALTH MAINTENANCE LETTER (OUTPATIENT)
Age: 52
End: 2022-10-15

## 2023-03-26 ENCOUNTER — HEALTH MAINTENANCE LETTER (OUTPATIENT)
Age: 53
End: 2023-03-26